# Patient Record
Sex: MALE | Race: WHITE | NOT HISPANIC OR LATINO | Employment: OTHER | URBAN - METROPOLITAN AREA
[De-identification: names, ages, dates, MRNs, and addresses within clinical notes are randomized per-mention and may not be internally consistent; named-entity substitution may affect disease eponyms.]

---

## 2017-01-18 ENCOUNTER — HOME CARE/HOSPICE - HEALTHEAST (OUTPATIENT)
Dept: HOME HEALTH SERVICES | Facility: HOME HEALTH | Age: 62
End: 2017-01-18

## 2017-03-27 ENCOUNTER — OFFICE VISIT - HEALTHEAST (OUTPATIENT)
Dept: VASCULAR SURGERY | Facility: CLINIC | Age: 62
End: 2017-03-27

## 2017-03-27 DIAGNOSIS — I89.0 ELEPHANTIASIS: ICD-10-CM

## 2017-03-27 DIAGNOSIS — E66.01 MORBID OBESITY WITH BMI OF 50.0-59.9, ADULT (H): ICD-10-CM

## 2017-03-27 DIAGNOSIS — I89.0 ACQUIRED LYMPHEDEMA: ICD-10-CM

## 2017-03-27 DIAGNOSIS — I87.303 VENOUS HYPERTENSION OF LOWER EXTREMITY, BILATERAL: ICD-10-CM

## 2017-03-27 RX ORDER — DULOXETIN HYDROCHLORIDE 60 MG/1
60 CAPSULE, DELAYED RELEASE ORAL
Status: SHIPPED | COMMUNITY
Start: 2017-03-07

## 2017-09-28 ENCOUNTER — OFFICE VISIT - HEALTHEAST (OUTPATIENT)
Dept: VASCULAR SURGERY | Facility: CLINIC | Age: 62
End: 2017-09-28

## 2017-09-28 DIAGNOSIS — L90.5 SCAR CONDITION AND FIBROSIS OF SKIN: ICD-10-CM

## 2017-09-28 DIAGNOSIS — E66.01 MORBID OBESITY WITH BMI OF 50.0-59.9, ADULT (H): ICD-10-CM

## 2017-09-28 DIAGNOSIS — I87.303 VENOUS HYPERTENSION OF LOWER EXTREMITY, BILATERAL: ICD-10-CM

## 2017-09-28 DIAGNOSIS — I87.2 VENOUS INSUFFICIENCY OF BOTH LOWER EXTREMITIES: ICD-10-CM

## 2017-09-28 DIAGNOSIS — M79.89 SWELLING OF LIMB: ICD-10-CM

## 2017-09-28 DIAGNOSIS — I89.0 ACQUIRED LYMPHEDEMA: ICD-10-CM

## 2017-09-28 DIAGNOSIS — L85.9 HYPERKERATOSIS OF SKIN: ICD-10-CM

## 2017-09-28 DIAGNOSIS — E11.42 DIABETIC PERIPHERAL NEUROPATHY ASSOCIATED WITH TYPE 2 DIABETES MELLITUS (H): ICD-10-CM

## 2017-09-28 RX ORDER — UREA 8.5 G/85G
CREAM TOPICAL
Status: SHIPPED | COMMUNITY
Start: 2017-08-29

## 2017-09-28 RX ORDER — IBUPROFEN 600 MG/1
TABLET ORAL
Refills: 4 | Status: SHIPPED | COMMUNITY
Start: 2017-08-07

## 2017-09-28 RX ORDER — OXYCODONE HYDROCHLORIDE 5 MG/1
5 TABLET ORAL
Status: SHIPPED | COMMUNITY
Start: 2017-09-05 | End: 2021-07-12

## 2017-09-28 RX ORDER — ACETIC ACID 20.65 MG/ML
SOLUTION AURICULAR (OTIC)
Status: SHIPPED | COMMUNITY
Start: 2017-08-29

## 2018-03-29 ENCOUNTER — OFFICE VISIT - HEALTHEAST (OUTPATIENT)
Dept: VASCULAR SURGERY | Facility: CLINIC | Age: 63
End: 2018-03-29

## 2018-03-29 DIAGNOSIS — I89.0 ACQUIRED LYMPHEDEMA: ICD-10-CM

## 2018-03-29 DIAGNOSIS — E66.9 EXTREME OBESITY: ICD-10-CM

## 2018-03-29 DIAGNOSIS — I87.2 VENOUS INSUFFICIENCY OF BOTH LOWER EXTREMITIES: ICD-10-CM

## 2018-03-29 DIAGNOSIS — I89.0 ELEPHANTIASIS: ICD-10-CM

## 2018-03-29 DIAGNOSIS — M79.89 SWELLING OF LIMB: ICD-10-CM

## 2018-03-29 DIAGNOSIS — E66.01 MORBID OBESITY WITH BMI OF 50.0-59.9, ADULT (H): ICD-10-CM

## 2018-03-29 DIAGNOSIS — L90.5 SCAR CONDITION AND FIBROSIS OF SKIN: ICD-10-CM

## 2018-03-29 DIAGNOSIS — I87.303 VENOUS HYPERTENSION OF LOWER EXTREMITY, BILATERAL: ICD-10-CM

## 2018-03-29 RX ORDER — WARFARIN SODIUM 3 MG/1
4 TABLET ORAL DAILY
Status: SHIPPED | COMMUNITY
Start: 2018-03-06

## 2018-11-29 ENCOUNTER — OFFICE VISIT - HEALTHEAST (OUTPATIENT)
Dept: VASCULAR SURGERY | Facility: CLINIC | Age: 63
End: 2018-11-29

## 2018-11-29 DIAGNOSIS — E66.9 EXTREME OBESITY: ICD-10-CM

## 2018-11-29 DIAGNOSIS — E11.42 DIABETIC PERIPHERAL NEUROPATHY ASSOCIATED WITH TYPE 2 DIABETES MELLITUS (H): ICD-10-CM

## 2018-11-29 DIAGNOSIS — I89.0 ELEPHANTIASIS: ICD-10-CM

## 2018-11-29 DIAGNOSIS — L85.9 HYPERKERATOSIS OF SKIN: ICD-10-CM

## 2018-11-29 DIAGNOSIS — I89.0 ACQUIRED LYMPHEDEMA: ICD-10-CM

## 2018-11-29 ASSESSMENT — MIFFLIN-ST. JEOR: SCORE: 2399.26

## 2019-06-03 ENCOUNTER — OFFICE VISIT - HEALTHEAST (OUTPATIENT)
Dept: VASCULAR SURGERY | Facility: CLINIC | Age: 64
End: 2019-06-03

## 2019-06-03 DIAGNOSIS — I89.0 ACQUIRED LYMPHEDEMA: ICD-10-CM

## 2019-06-03 DIAGNOSIS — E66.01 MORBID OBESITY WITH BMI OF 50.0-59.9, ADULT (H): ICD-10-CM

## 2019-06-03 DIAGNOSIS — L90.5 SCAR CONDITION AND FIBROSIS OF SKIN: ICD-10-CM

## 2019-06-03 DIAGNOSIS — I89.0 ELEPHANTIASIS: ICD-10-CM

## 2019-06-03 DIAGNOSIS — I87.303 VENOUS HYPERTENSION OF LOWER EXTREMITY, BILATERAL: ICD-10-CM

## 2019-06-03 DIAGNOSIS — I87.2 VENOUS INSUFFICIENCY OF BOTH LOWER EXTREMITIES: ICD-10-CM

## 2019-06-03 DIAGNOSIS — L85.9 HYPERKERATOSIS OF SKIN: ICD-10-CM

## 2019-06-03 DIAGNOSIS — M79.89 SWELLING OF LIMB: ICD-10-CM

## 2019-06-03 DIAGNOSIS — E11.42 DIABETIC PERIPHERAL NEUROPATHY ASSOCIATED WITH TYPE 2 DIABETES MELLITUS (H): ICD-10-CM

## 2019-06-03 RX ORDER — GLUCOSAMINE HCL/CHONDROITIN SU 500-400 MG
CAPSULE ORAL
Status: SHIPPED | COMMUNITY
Start: 2019-04-03

## 2019-06-03 RX ORDER — PEN NEEDLE, DIABETIC 32GX 5/32"
NEEDLE, DISPOSABLE MISCELLANEOUS
Refills: 5 | Status: SHIPPED | COMMUNITY
Start: 2019-04-29

## 2019-06-03 RX ORDER — LISINOPRIL 20 MG/1
20 TABLET ORAL DAILY
Refills: 1 | Status: SHIPPED | COMMUNITY
Start: 2019-03-09

## 2019-06-03 RX ORDER — GLIMEPIRIDE 4 MG/1
TABLET ORAL
Status: SHIPPED | COMMUNITY
Start: 2019-03-17

## 2019-06-03 RX ORDER — CYCLOBENZAPRINE HCL 5 MG
5-10 TABLET ORAL
Status: SHIPPED | COMMUNITY
Start: 2019-04-10

## 2019-06-03 RX ORDER — ZINC GLUCONATE 50 MG
1 TABLET ORAL
Status: SHIPPED | COMMUNITY
Start: 2019-03-14

## 2019-06-03 RX ORDER — SYRINGE,INSUL U-500,NDL,0.5ML 31GX15/64"
SYRINGE, EMPTY DISPOSABLE MISCELLANEOUS
Refills: 1 | Status: SHIPPED | COMMUNITY
Start: 2018-06-05

## 2019-06-03 RX ORDER — MAGNESIUM GLUCONATE 30 MG(550)
1 TABLET ORAL
Status: SHIPPED | COMMUNITY
Start: 2019-03-14

## 2019-06-03 RX ORDER — LEVETIRACETAM 750 MG/1
750 TABLET ORAL 2 TIMES DAILY
Refills: 2 | Status: SHIPPED | COMMUNITY
Start: 2018-08-05

## 2019-06-03 RX ORDER — EMPAGLIFLOZIN, METFORMIN HYDROCHLORIDE 12.5; 1 MG/1; MG/1
TABLET, EXTENDED RELEASE ORAL
Refills: 11 | Status: SHIPPED | COMMUNITY
Start: 2019-05-10

## 2019-06-03 RX ORDER — SYRINGE-NEEDLE,INSULIN,0.5 ML 27GX1/2"
SYRINGE, EMPTY DISPOSABLE MISCELLANEOUS
Refills: 0 | Status: SHIPPED | COMMUNITY
Start: 2018-12-02

## 2019-06-03 ASSESSMENT — MIFFLIN-ST. JEOR: SCORE: 2433.27

## 2019-06-05 ENCOUNTER — COMMUNICATION - HEALTHEAST (OUTPATIENT)
Dept: VASCULAR SURGERY | Facility: CLINIC | Age: 64
End: 2019-06-05

## 2019-12-05 ENCOUNTER — OFFICE VISIT - HEALTHEAST (OUTPATIENT)
Dept: VASCULAR SURGERY | Facility: CLINIC | Age: 64
End: 2019-12-05

## 2019-12-05 DIAGNOSIS — E66.01 MORBID OBESITY WITH BMI OF 50.0-59.9, ADULT (H): ICD-10-CM

## 2019-12-05 DIAGNOSIS — I89.0 ELEPHANTIASIS: ICD-10-CM

## 2019-12-05 DIAGNOSIS — I87.303 VENOUS HYPERTENSION OF LOWER EXTREMITY, BILATERAL: ICD-10-CM

## 2019-12-05 DIAGNOSIS — I89.0 ACQUIRED LYMPHEDEMA: ICD-10-CM

## 2019-12-05 DIAGNOSIS — I87.2 VENOUS INSUFFICIENCY OF BOTH LOWER EXTREMITIES: ICD-10-CM

## 2019-12-05 DIAGNOSIS — L85.9 HYPERKERATOSIS OF SKIN: ICD-10-CM

## 2019-12-05 DIAGNOSIS — L90.5 SCAR CONDITION AND FIBROSIS OF SKIN: ICD-10-CM

## 2019-12-05 DIAGNOSIS — E11.42 DIABETIC PERIPHERAL NEUROPATHY ASSOCIATED WITH TYPE 2 DIABETES MELLITUS (H): ICD-10-CM

## 2019-12-05 RX ORDER — LOPERAMIDE HCL 2 MG
2 CAPSULE ORAL EVERY 4 HOURS PRN
Status: SHIPPED | COMMUNITY
Start: 2019-12-05

## 2019-12-05 RX ORDER — POLYMYXIN B SULFATE AND TRIMETHOPRIM 1; 10000 MG/ML; [USP'U]/ML
SOLUTION OPHTHALMIC
Status: SHIPPED | COMMUNITY
Start: 2019-12-05

## 2019-12-05 RX ORDER — TOBRAMYCIN AND DEXAMETHASONE 3; 1 MG/ML; MG/ML
1 SUSPENSION/ DROPS OPHTHALMIC
Status: SHIPPED | COMMUNITY
Start: 2019-12-05

## 2019-12-05 RX ORDER — IRON ASPGLY/C/B12/FA/CA-TH/SUC 70-150-1MG
1 TABLET ORAL DAILY
Status: SHIPPED | COMMUNITY
Start: 2019-12-05

## 2019-12-05 RX ORDER — EMPAGLIFLOZIN 25 MG/1
1 TABLET, FILM COATED ORAL DAILY
Refills: 0 | Status: SHIPPED | COMMUNITY
Start: 2019-09-20

## 2019-12-05 ASSESSMENT — MIFFLIN-ST. JEOR: SCORE: 2347.09

## 2021-02-22 ENCOUNTER — OFFICE VISIT - HEALTHEAST (OUTPATIENT)
Dept: VASCULAR SURGERY | Facility: CLINIC | Age: 66
End: 2021-02-22

## 2021-02-22 DIAGNOSIS — M79.89 SWELLING OF LIMB: ICD-10-CM

## 2021-02-22 DIAGNOSIS — L85.9 HYPERKERATOSIS OF SKIN: ICD-10-CM

## 2021-02-22 DIAGNOSIS — E11.42 DIABETIC PERIPHERAL NEUROPATHY ASSOCIATED WITH TYPE 2 DIABETES MELLITUS (H): ICD-10-CM

## 2021-02-22 DIAGNOSIS — L90.5 SCAR CONDITION AND FIBROSIS OF SKIN: ICD-10-CM

## 2021-02-22 DIAGNOSIS — I89.0 ACQUIRED LYMPHEDEMA: ICD-10-CM

## 2021-02-22 DIAGNOSIS — I87.303 VENOUS HYPERTENSION OF LOWER EXTREMITY, BILATERAL: ICD-10-CM

## 2021-02-22 DIAGNOSIS — E66.01 MORBID OBESITY WITH BMI OF 50.0-59.9, ADULT (H): ICD-10-CM

## 2021-02-22 DIAGNOSIS — I89.0 ELEPHANTIASIS: ICD-10-CM

## 2021-02-22 DIAGNOSIS — I69.052: ICD-10-CM

## 2021-05-29 NOTE — PROGRESS NOTES
Date of Service: 06/03/19    Date last seen: 11/29/18    PCP: Irving Hodgson MD    Impression:   1.   Bilateral leg swelling -doing much better  2.   Secondary lymphedema-Stage 3 severe -elephantiasis  3.   Venous hypertension bilateral legs  4.   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  5.   Hypertrophy of toenails  6.   Long term anticoagulation  7.   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   Complicated by morbid obesity   9.   Complicated by type 2 diabetes with peripheral neuropathy    Plan:   1.   Questions were answered.  He is here with his cousin.  2.   Continue velcro compression.  3.   Continue lac hydrin for hyperkeratosis. After discussion of risk factors and consent obtained under clean conditions, with his underlying diabetes and anticoagulation the hyperkeratosis was debrided as it has softened.  The curette was used.  A total of 82 cm  was debrided .  This took off significant pressure from the skin, decreasing his risk of further damage.  This type of debridement is considered through the epidermis area.  The feet and ankles were much improved after this..  4.   Elevate legs as much as able.  He is doing very well and he should continue his present cares.  5.   Patient will follow up in 6 months or when needed.     Time spent with patient 15 minutes with greater than 50% time in consultation, education and coordination of care.   ---------------------------------------------------------------------------------------------------------------------     Chief Complaint: Bilateral leg swelling and toe ulcerations    History of Present Illness:   Karsten Mccain returns to the [facility] for follow up of bilateral leg swelling with toe ulcerations. He is here with his sister. This is  complicated by severe secondary lymphedema stage III, elephantiasis, with fibrosis scarring with keratoderma and papillomatosis. He has right leg paresis status post old left CVA. He has type 2 diabetes with peripheral neuropathy and is supra-morbidly obese and his history is further complicated by right hemorrhagic CVA causing slight left hand weakness and word finding difficulties.  He is regularly wearing his velcro compression which works well for him. There is no weeping. He says the lac hydrin cream and this continues to decrease the hyperkeratosis. Swelling is under good control.  There has been no new numbness, tingling, weakness, masses, rashes, shortness of breath or chest pain. There has been no new fevers or pain.  Pain is rated a 0 on a 10 point scale.        Past Medical History:   Diagnosis Date     Abscess or cellulitis of leg 2/27/2015     Acquired lymphedema 1/11/2011     Activated protein C resistance (H) 1/16/2011     Advance directive discussed with patient 12/20/2010    Overview:  Patient has identified Health Care Agent(s): Yes Add Health Care Agents: Yes   Health Care Agent(s): Primary Health Care Agent: Miguel Andrewnatalie Relationship: Father Phone: [phone]  Secondary Health Care Agent: Ana Lilia Mccain Relationship: Mother Phone: [phone]     Patient has Advanced Care Plan Documents (Health Care Directive, POLST): Yes   Advance Care Plan Documents: Health     Airway hyperreactivity 2/27/2013     Amnesia 9/2/2010     Ankylosing polyarthritis (H) 6/1/2006    Overview:  On methotrexate and remicade  Overview:  diagnosed 1982 sees Dr. Reis      Apnea, sleep 6/13/2006    Overview:  Uses nasal BIPAP inactive icd-9 diagnosis auto replaced with icd-10, display name retained//mporz      Arthralgia of hip 3/5/2013     Avitaminosis D 3/8/2012    Overview:  inactive icd-9 diagnosis auto replaced with icd-10, display name retained//mporz      Benign prostatic hyperplasia with urinary obstruction  2/4/2008     Cannot sleep 11/6/2008    Overview:  inactive icd-9 diagnosis auto replaced with icd-10, display name retained//mporz      Cardiac failure right (H) 3/30/2011     Cerebral infarct (H) 1/11/2011    Overview:  Basal ganglion       Clotting disorder (H)      Combined fat and carbohydrate induced hyperlipemia 11/25/2010     Diabetes (H) 1/11/2011    Overview:  HEMOGLOBIN A1C (%)  Date Value  11/27/2010 9.1*  7/1/2004 6.2        Diabetic neuropathy (H) 3/1/2011     Disturbance of skin sensation 11/2/2015     DU (duodenal ulcer) 3/29/2015     Encounter for therapeutic drug monitoring 11/5/2013     Essential (primary) hypertension 11/2/2015    Overview:  inactive icd-9 diagnosis auto replaced with icd-10, display name retained//mporz      Extreme obesity 11/2/2015     Foot ulcer (H) 3/5/2013     Hypertensive pulmonary vascular disease (H) 1/11/2012    fused rib cage causing restriction and pulm htn related RV failure.     Stroke (H)     right leg and arm weakness around 2010/2011 he thinks., has poor memory and his initial aphasia as well that has improved.       Past Surgical History:   Procedure Laterality Date     CHOLECYSTECTOMY       colostomy         Current Outpatient Medications   Medication Sig Dispense Refill     acetaminophen (TYLENOL) 500 MG tablet Take 1,000 mg by mouth every 8 (eight) hours.        acetic acid (VOSOL) 2 % otic solution Administer into ears.       ammonium lactate (LAC-HYDRIN) 12 % lotion        atorvastatin (LIPITOR) 40 MG tablet Take 40 mg by mouth daily. Indications: Hypercholesterolemia       baclofen (LIORESAL) 10 MG tablet TK 1 T PO TID  5     blood glucose test (CONTOUR NEXT TEST STRIPS) strips by Other route.       bumetanide (BUMEX) 1 MG tablet Take 2 mg by mouth daily.       cholecalciferol, vitamin D3, 5,000 unit capsule Take 1 capsule by mouth.       clindamycin (CLINDAGEL) 1 % gel Apply topically.       clotrimazole (LOTRIMIN) 1 % cream Apply topically.        "cyclobenzaprine (FLEXERIL) 5 MG tablet Take 5-10 mg by mouth.       diphenhydrAMINE (BENADRYL) 12.5 mg/5 mL liquid Take 12.5 mg by mouth.       DULoxetine (CYMBALTA) 60 MG capsule Take 60 mg by mouth.       finasteride (PROSCAR) 5 mg tablet Take 5 mg by mouth daily.       folic acid (FOLVITE) 1 MG tablet Take 1 mg by mouth daily.       gabapentin (NEURONTIN) 400 MG capsule Take 800 mg by mouth 3 (three) times a day. Indications: Neuropathic Pain       generic lancets (ACCU-CHEK MULTICLIX LANCET) Test five times daily as directed       glimepiride (AMARYL) 4 MG tablet TAKE 1 TABLET(4 MG) BY MOUTH TWICE DAILY       guaiFENesin (HUMIBID 3) 400 mg Tab Take 400 mg by mouth every 4 (four) hours as needed.       hydrocortisone 1 % cream Apply 1 application topically as needed.       inFLIXimab (REMICADE) 100 mg injection Infuse 600 mg into a venous catheter every 2 (two) months.       insulin regular hum U-500 conc, HumuLIN R, (HUMULIN R CONC) 500 unit/mL CONCENTRATED injection USING A U100 SYRINGE DRAW UP 50 UNITS FOR SUBCUTANEOUS INJECTION BEFORE BREAKFAST, 60 UNITS BEFORE DINNER AND 20 UNITS AT MIDNIGHT  Indications: Diabetes Mellitus with Severe Insulin Resistance       insulin syringe-needle U-100 1 mL 31 gauge x 5/16 Syrg TEST TID.  0     insulin syringe-needle U-100 1/2 mL 31 x 5/16\" Syrg        insulin U-500 syringe-needle (BD INSULIN SYRINGE U-500) 1/2 mL 31 gauge x 15/64\" Syrg Use to give insulin injections 2 times daily.       lactic acid-urea Lotn Apply to crusting, thickened areas of skin twice a day until clear. 60 mL 3     levETIRAcetam (KEPPRA) 750 MG tablet Take 750 mg by mouth 2 (two) times a day.  2     lidocaine (LIDODERM) 5 % Place 1 patch on the skin daily.       lisinopril (PRINIVIL,ZESTRIL) 20 MG tablet Take 20 mg by mouth daily.  1     loratadine (CLARITIN) 10 mg tablet Take 10 mg by mouth daily.       magnesium gluconate 30 mg (550 mg) Tab Take 1 tablet by mouth.       magnesium oxide 250 mg Tab " "Take 500 mg by mouth 3 (three) times a week.        metFORMIN (GLUCOPHAGE) 500 MG tablet TAKE 2 TABLETS BY MOUTH TWICE DAILY WITH BREAKFAST AND DINNER       methocarbamol (ROBAXIN) 500 MG tablet 1 tab three times a day as needed       methotrexate 2.5 MG tablet Take 10 mg by mouth once a week.       metoprolol succinate (TOPROL-XL) 25 MG Take 25 mg by mouth 2 (two) times a day.       miscellaneous medical supply Misc Bipap, heated humidifer, mask, headgear, filters and tubing. Pressure 19/13.  For home use. Length of need: 99       nitroglycerin (NITROSTAT) 0.4 MG SL tablet Place 1 tablet under the tongue as needed.       nystatin (MYCOSTATIN) powder Apply topically as needed.       omeprazole (PRILOSEC) 20 MG capsule Take 20 mg by mouth.       oxyCODONE (ROXICODONE) 5 MG immediate release tablet Take 5 mg by mouth.       pantoprazole (PROTONIX) 40 MG tablet Take 40 mg by mouth 2 (two) times a day. Indications: Gastric Ulcer       pen needle, diabetic (BD ULTRA-FINE PAPO PEN NEEDLE) 32 gauge x 5/32\" Ndle Use as directed with Insulin Pen and Victoza       potassium chloride (KLOR-CON) 10 MEQ CR tablet Take 40 mEq by mouth daily. 3 tablets at lunch, 3 tablets at dinner.       prochlorperazine (COMPAZINE) 10 MG tablet Take 10 mg by mouth every 8 (eight) hours as needed.       spironolactone (ALDACTONE) 50 MG tablet Take 100 mg by mouth daily.       SYNJARDY XR 12.5-1,000 mg TBph TK 1 T PO BID. REPLACING INVOKAMET  11     tamsulosin (FLOMAX) 0.4 mg Cp24 Take 0.4 mg by mouth daily.       terbinafine HCl (LAMISIL AT) 1 % cream Apply 1 application topically. To legs PRN       urea-alpha hydroxy acids (ATRAC-TAIN) 10-4 % Crea Apply topically.       warfarin (COUMADIN) 3 MG tablet Take 4 mg by mouth daily.       zinc gluconate 50 mg tablet Take 1 tablet by mouth.       amoxicillin (AMOXIL) 500 MG capsule Take 500 mg by mouth as needed. Prior to dental work       BD INSULIN SYRINGE U-500 1/2 mL 31 gauge x 15/64\" Syrg USE BID  " "1     BD ULTRA-FINE PAPO PEN NEEDLE 32 gauge x 5/32\" Ndle USE UTD WITH VICTOZA AND INSULIN PENS  5     canagliflozin (INVOKANA) 300 mg Tab Take 300 mg by mouth daily. Indications: Type 2 Diabetes Mellitus       canagliflozin-metformin 150-1,000 mg Tab Take 1 tablet by mouth.       cholecalciferol, vitamin D3, 5,000 unit capsule Take 5,000 Units by mouth daily.  3     CONTOUR NEXT TEST STRIPS strips TEST FID  3     ferrous gluconate 324 mg (37.5 mg iron) Tab Take 1 tablet by mouth daily.       liraglutide (VICTOZA 2-KAYLYNN) 0.6 mg/0.1 mL (18 mg/3 mL) PnIj injection Inject 1.8 mg under the skin daily.       NASONEX 50 mcg/actuation nasal spray SHAKE LQ AND U 2 SPRAYS IEN BID  4     Current Facility-Administered Medications   Medication Dose Route Frequency Provider Last Rate Last Dose     lidocaine 2 % jelly (XYLOCAINE)   Topical PRN Laura Levy MD   1 application at 10/24/16 1235       Allergies   Allergen Reactions     Aspirin Other (See Comments)     Not allergic. Patient is on coumadin  Patient on coumadin       Clindamycin Other (See Comments)     Significant heartburn when taking 300 mg four times per day .     Levaquin [Levofloxacin] Other (See Comments)     Muscle burn per pt.     Lovastatin Myalgia     LW Reaction: myalgias     Other Allergy (See Comments) Other (See Comments)     PN: LW FI1: nka  PN: LW CM1: Contrast Adverse Reaction - None Reaction :  PN: LW CM1: Contrast Adverse Reaction - None Reaction :       Social History     Socioeconomic History     Marital status: Single     Spouse name: Not on file     Number of children: Not on file     Years of education: Not on file     Highest education level: Not on file   Occupational History     Not on file   Social Needs     Financial resource strain: Not on file     Food insecurity:     Worry: Not on file     Inability: Not on file     Transportation needs:     Medical: Not on file     Non-medical: Not on file   Tobacco Use     Smoking status: " Never Smoker     Smokeless tobacco: Never Used   Substance and Sexual Activity     Alcohol use: No     Comment: rare socially, not in years.     Drug use: No     Sexual activity: Never   Lifestyle     Physical activity:     Days per week: Not on file     Minutes per session: Not on file     Stress: Not on file   Relationships     Social connections:     Talks on phone: Not on file     Gets together: Not on file     Attends [employer] service: Not on file     Active member of club or organization: Not on file     Attends meetings of clubs or organizations: Not on file     Relationship status: Not on file     Intimate partner violence:     Fear of current or ex partner: Not on file     Emotionally abused: Not on file     Physically abused: Not on file     Forced sexual activity: Not on file   Other Topics Concern     Not on file   Social History Narrative    Single.  No kids.  Retired .       Family History   Problem Relation Age of Onset     Arthritis Mother      Heart disease Father      Diabetes Sister      Cancer Sister      Uterine cancer Sister      Diabetes Sister      No Medical Problems Sister      Stroke Brother      Review of Systems:    Karsten Mccain no new numbess, tingling or weakness, redness or rashes, fevers, new masses, abdominal bloating or discomfort, unexplained weight loss, increased pain, new ulcers, shortness of breath and chest pain  Full 12 point review of systems was completed.    Labs:    I personally reviewed the following labs today and those on care everywhere, if indicated    4/3/2019 creatinine 1 GFR greater than 60 hemoglobin A1c 10.1    No results found for: SEDRATE      No results found for: CRP        Lab Results   Component Value Date    CREATININE 1.12 01/17/2017      No results found for: HGBA1C        Lab Results   Component Value Date    BUN 22 01/17/2017              No results found for: LABPROT, ALBUMIN    No results found for: [id number]    No  results found for: TSH  No results found for: WBC, HGB, HCT, MCV, PLT     Imaging:      I personally reviewed the following imaging today and those on care everywhere, if indicated      Interface, Michelle - 08/03/2018  7:40 AM CDT  Clinical History: F/U Intracranial Bleed    Technique: Noncontrast axial CT images are obtained of the brain. This CT Scan used dose modulation, iterative reconstruction, and/or weight based dosing when appropriate to reduce radiation dose to as low as reasonably achievable.    Comparison: 8/2/2018 head CT    Findings: Right frontal intraparenchymal hematoma measures 3.0 x 3.6 cm (series 2 image 22) compared to 2.7 x 3.6 cm on the study of one day prior. There is surrounding vasogenic edema. There is small amount of extra-axial hemorrhage overlying the right frontal lobe and extending into several subjacent sulci. There is local sulcal effacement. No significant mass effect. No midline shift. Ventricles normal size. Basal cisterns are widely patent.    Encephalomalacia in the left corona radiata and basal ganglia region with subjacent ex vacuo frontal horn dilatation.    No calvarial fracture. Paranasal sinuses and temporal bone structures are well aerated. There is atlantodental joint osteoarthrosis. Skull base structures are intact.    Impression:   1. Grossly stable right frontal intraparenchymal hemorrhage with surrounding vasogenic edema and small amount of extra-axial hemorrhage overlying the right frontal lobe and extending to several adjacent sulci. No significant mass effect.  2. Left cerebral hemispheric encephalomalacia.   3. The right frontal hematoma is an atypical location for hypertensive bleed. Hemorrhage related to underlying mass lesion, vascular malformation or cerebral amyloid angiopathy are considerations. Consider further evaluation with brain MRI/MRA.    [facility]  Nothing new to review  Physical Exam:  Vitals:    06/03/19 1409   BP:  122/72   Pulse: 64   Resp: 20   Temp: 98.6  F (37  C)     BMI 60.69 (increased) weight 376 pounds    Circumferential measures:    Vasc Edema 3/27/2017 9/28/2017 3/29/2018 11/29/2018 6/3/2019   Right just above MTP 35 24.7 34 34.5 33.3   Right Ankle 35.5 38.2 35 35.3 35   Right Widest Calf 55 53.3 51.5  53.8 50.7   Right Thigh Up 10cm 71.3 74.5   72.5 70.9   Left - just above MTP 35.7 35.5 35 35 34   Left Ankle 35.6 38.2 34.5 35.8 34.8   Left Widest Calf 53.2 52.3 52.5 54.4 47.8   Left Thigh Up 10cm 71.8 72.9 - 76.2 70.8     Measures are stable.    General:  63 y.o. male in no apparent distress.      Psych:  Alert and oriented x 3.  Cooperative. Affect normal.    Vascular: There are no significant telangietasias, medial ankle venous flares, venous varicosities  and spider veins .     Neurological:  Decreased to PP and LT in the feet bilaterally.  Strength normal in knee flexion/extentsion, ankle dorsiflexion and great toe extension bilaterally.      Integumentary: Skin of the legs is uniformly warm and dry, erythematous, with papillomatosis, with hyperkeratosis and keratoderma and with positive Stemmer's Sign in the bilateral toes.  The hyperkeratosis continues to decrease and is much more easily removed.  There is just a small amount remaining.  There is no weeping in either leg or foot.  Toenails are hypertrophied.  Skin of the legs and feet continue to be improved from his baseline and is softer.  No open ulcerations are noted. There is no pain to palpation.    Laura Levy MD, ABWMS, FACCWS, [facility]  Medical Director Wound Care and Lymphedema  [facility]  [phone]

## 2021-05-29 NOTE — TELEPHONE ENCOUNTER
Prism spoke with patient's sister, they decided not to purchase the velcro compression at that time but may call back in the future to purchase.

## 2021-06-02 VITALS — HEIGHT: 67 IN | WEIGHT: 315 LBS | BODY MASS INDEX: 49.44 KG/M2

## 2021-06-03 VITALS — WEIGHT: 315 LBS | BODY MASS INDEX: 50.62 KG/M2 | HEIGHT: 66 IN

## 2021-06-04 VITALS
DIASTOLIC BLOOD PRESSURE: 54 MMHG | WEIGHT: 315 LBS | HEART RATE: 68 BPM | RESPIRATION RATE: 24 BRPM | HEIGHT: 66 IN | SYSTOLIC BLOOD PRESSURE: 102 MMHG | BODY MASS INDEX: 50.62 KG/M2 | TEMPERATURE: 98.2 F

## 2021-06-04 NOTE — PROGRESS NOTES
Date of Service: 12/05/19    Date last seen: 06/03/19    PCP: Irving Hodgson MD    Impression:   1. Bilateral leg swelling -doing much better  2. Secondary lymphedema-Stage 3 severe -elephantiasis  3. Venous hypertension bilateral legs  4. 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  5. Long term anticoagulation  6. 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 Morbid obesity   8. Type 2 diabetes with peripheral neuropathy    Plan:   1. Questions were answered.  He is here with his cousin.  2. Continue velcro compression.  My nurse will measure him and give him information on how to get new Velcro compression.  3. Continue lac hydrin for hyperkeratosis.   4. After discussion of risk factors and consent obtained under clean conditions, with his underlying diabetes and anticoagulation the hyperkeratosis was debrided as it has softened.  The curette was used.  A total of 45 cm  was debrided .  This took off significant pressure from the skin, decreasing his risk of further damage.  This type of debridement is considered through the epidermis area.  The feet and ankles were much improved after this.  5. Patient will follow up in 6 months or when needed.     Time spent with patient 15 minutes with greater than 50% time in consultation, education and coordination of care.   ---------------------------------------------------------------------------------------------------------------------     Chief Complaint: Bilateral leg swelling and toe ulcerations    History of Present Illness:   Karsten Mccain returns to the  [facility] for follow up of bilateral leg swelling with toe ulcerations complicated by severe secondary lymphedema stage III, elephantiasis, with fibrosis scarring with keratoderma, papillomatosis and right leg  paresis status post old left CVA. He has type 2 diabetes with peripheral neuropathy and is morbidly obese.  He is here with his cousin.  They report the swelling is under good control.  He is regularly wearing his velcro compression which works well for him. There is no weeping. He continues to use the lac hydrin cream to decrease the hyperkeratosis.  He would like an additional pair of Velcro compression as the other ones are starting to wear.  He has not had any new ulcerations or signs of infections.  There has been no new numbness, tingling, weakness, masses, rashes, shortness of breath or chest pain. There has been no new fevers or pain.  Pain is rated a 0 on a 10 point scale.        Past Medical History:   Diagnosis Date     Abscess or cellulitis of leg 2/27/2015     Acquired lymphedema 1/11/2011     Activated protein C resistance (H) 1/16/2011     Advance directive discussed with patient 12/20/2010    Overview:  Patient has identified Health Care Agent(s): Yes Add Health Care Agents: Yes   Health Care Agent(s): Primary Health Care Agent: Miguel Mccain Relationship: Father Phone: [phone]  Secondary Health Care Agent: Ana Lilia Mccain Relationship: Mother Phone: [phone]     Patient has Advanced Care Plan Documents (Health Care Directive, POLST): Yes   Advance Care Plan Documents: Health     Airway hyperreactivity 2/27/2013     Amnesia 9/2/2010     Ankylosing polyarthritis (H) 6/1/2006    Overview:  On methotrexate and remicade  Overview:  diagnosed 1982 sees Dr. Reis      Apnea, sleep 6/13/2006    Overview:  Uses nasal BIPAP inactive icd-9 diagnosis auto replaced with icd-10, display name retained//mporz      Arthralgia of hip 3/5/2013     Avitaminosis D 3/8/2012    Overview:  inactive icd-9 diagnosis auto replaced with icd-10, display name retained//mporz      Benign prostatic hyperplasia with urinary obstruction 2/4/2008     Cannot sleep 11/6/2008    Overview:  inactive icd-9 diagnosis auto  "replaced with icd-10, display name retained//mporz      Cardiac failure right (H) 3/30/2011     Cerebral infarct (H) 1/11/2011    Overview:  Basal ganglion       Clotting disorder (H)      Combined fat and carbohydrate induced hyperlipemia 11/25/2010     Diabetes (H) 1/11/2011    Overview:  HEMOGLOBIN A1C (%)  Date Value  11/27/2010 9.1*  7/1/2004 6.2        Diabetic neuropathy (H) 3/1/2011     Disturbance of skin sensation 11/2/2015     DU (duodenal ulcer) 3/29/2015     Encounter for therapeutic drug monitoring 11/5/2013     Essential (primary) hypertension 11/2/2015    Overview:  inactive icd-9 diagnosis auto replaced with icd-10, display name retained//mporz      Extreme obesity 11/2/2015     Foot ulcer (H) 3/5/2013     Hypertensive pulmonary vascular disease (H) 1/11/2012    fused rib cage causing restriction and pulm htn related RV failure.     Stroke (H)     right leg and arm weakness around 2010/2011 he thinks., has poor memory and his initial aphasia as well that has improved.       Past Surgical History:   Procedure Laterality Date     CHOLECYSTECTOMY       colostomy         Current Outpatient Medications   Medication Sig Dispense Refill     acetaminophen (TYLENOL) 500 MG tablet Take 1,000 mg by mouth every 8 (eight) hours.        acetic acid (VOSOL) 2 % otic solution Administer into ears.       ammonium lactate (LAC-HYDRIN) 12 % lotion        amoxicillin (AMOXIL) 500 MG capsule Take 500 mg by mouth as needed. Prior to dental work       atorvastatin (LIPITOR) 40 MG tablet Take 40 mg by mouth daily. Indications: Hypercholesterolemia       baclofen (LIORESAL) 10 MG tablet TK 1 T PO TID  5     BD INSULIN SYRINGE U-500 1/2 mL 31 gauge x 15/64\" Syrg USE BID  1     BD ULTRA-FINE PAPO PEN NEEDLE 32 gauge x 5/32\" Ndle USE UTD WITH VICTOZA AND INSULIN PENS  5     blood glucose test (CONTOUR NEXT TEST STRIPS) strips by Other route.       bumetanide (BUMEX) 1 MG tablet Take 2 mg by mouth daily.       canagliflozin " "(INVOKANA) 300 mg Tab Take 300 mg by mouth daily. Indications: Type 2 Diabetes Mellitus       canagliflozin-metformin 150-1,000 mg Tab Take 1 tablet by mouth.       cholecalciferol, vitamin D3, 5,000 unit capsule Take 1 capsule by mouth.       cholecalciferol, vitamin D3, 5,000 unit capsule Take 5,000 Units by mouth daily.  3     clindamycin (CLINDAGEL) 1 % gel Apply topically.       clotrimazole (LOTRIMIN) 1 % cream Apply topically.       CONTOUR NEXT TEST STRIPS strips TEST FID  3     cyclobenzaprine (FLEXERIL) 5 MG tablet Take 5-10 mg by mouth.       diphenhydrAMINE (BENADRYL) 12.5 mg/5 mL liquid Take 12.5 mg by mouth.       DULoxetine (CYMBALTA) 60 MG capsule Take 60 mg by mouth.       empagliflozin 25 mg Tab Take 1 tablet by mouth daily.       ferrous gluconate 324 mg (37.5 mg iron) Tab Take 1 tablet by mouth daily.       finasteride (PROSCAR) 5 mg tablet Take 5 mg by mouth daily.       folic acid (FOLVITE) 1 MG tablet Take 1 mg by mouth daily.       gabapentin (NEURONTIN) 400 MG capsule Take 800 mg by mouth 3 (three) times a day. Indications: Neuropathic Pain       generic lancets (ACCU-CHEK MULTICLIX LANCET) Test five times daily as directed       glimepiride (AMARYL) 4 MG tablet TAKE 1 TABLET(4 MG) BY MOUTH TWICE DAILY       guaiFENesin (HUMIBID 3) 400 mg Tab Take 400 mg by mouth every 4 (four) hours as needed.       hydrocortisone 1 % cream Apply 1 application topically as needed.       inFLIXimab (REMICADE) 100 mg injection Infuse 600 mg into a venous catheter every 2 (two) months.       insulin regular hum U-500 conc, HumuLIN R, (HUMULIN R CONC) 500 unit/mL CONCENTRATED injection USING A U100 SYRINGE DRAW UP 50 UNITS FOR SUBCUTANEOUS INJECTION BEFORE BREAKFAST, 60 UNITS BEFORE DINNER AND 20 UNITS AT MIDNIGHT  Indications: Diabetes Mellitus with Severe Insulin Resistance       insulin syringe-needle U-100 1 mL 31 gauge x 5/16 Syrg TEST TID.  0     insulin syringe-needle U-100 1/2 mL 31 x 5/16\" Syrg        " "insulin U-500 syringe-needle (BD INSULIN SYRINGE U-500) 1/2 mL 31 gauge x 15/64\" Syrg Use to give insulin injections 2 times daily.       lactic acid-urea Lotn Apply to crusting, thickened areas of skin twice a day until clear. 60 mL 3     levETIRAcetam (KEPPRA) 750 MG tablet Take 750 mg by mouth 2 (two) times a day.  2     lidocaine (LIDODERM) 5 % Place 1 patch on the skin daily.       liraglutide (VICTOZA 2-KAYLYNN) 0.6 mg/0.1 mL (18 mg/3 mL) PnIj injection Inject 1.8 mg under the skin daily.       lisinopril (PRINIVIL,ZESTRIL) 20 MG tablet Take 20 mg by mouth daily.  1     loratadine (CLARITIN) 10 mg tablet Take 10 mg by mouth daily.       magnesium gluconate 30 mg (550 mg) Tab Take 1 tablet by mouth.       magnesium oxide 250 mg Tab Take 500 mg by mouth 3 (three) times a week.        metFORMIN (GLUCOPHAGE) 500 MG tablet TAKE 2 TABLETS BY MOUTH TWICE DAILY WITH BREAKFAST AND DINNER       methocarbamol (ROBAXIN) 500 MG tablet 1 tab three times a day as needed       methotrexate 2.5 MG tablet Take 10 mg by mouth once a week.       metoprolol succinate (TOPROL-XL) 25 MG Take 25 mg by mouth 2 (two) times a day.       miscellaneous medical supply Misc Bipap, heated humidifer, mask, headgear, filters and tubing. Pressure 19/13.  For home use. Length of need: 99       NASONEX 50 mcg/actuation nasal spray SHAKE LQ AND U 2 SPRAYS IEN BID  4     nitroglycerin (NITROSTAT) 0.4 MG SL tablet Place 1 tablet under the tongue as needed.       nystatin (MYCOSTATIN) powder Apply topically as needed.       omeprazole (PRILOSEC) 20 MG capsule Take 20 mg by mouth.       oxyCODONE (ROXICODONE) 5 MG immediate release tablet Take 5 mg by mouth.       pantoprazole (PROTONIX) 40 MG tablet Take 40 mg by mouth 2 (two) times a day. Indications: Gastric Ulcer       pen needle, diabetic (BD ULTRA-FINE PAPO PEN NEEDLE) 32 gauge x 5/32\" Ndle Use as directed with Insulin Pen and Victoza       potassium chloride (KLOR-CON) 10 MEQ CR tablet Take 40 mEq " by mouth daily. 3 tablets at lunch, 3 tablets at dinner.       prochlorperazine (COMPAZINE) 10 MG tablet Take 10 mg by mouth every 8 (eight) hours as needed.       semaglutide 0.25 mg or 0.5 mg(2 mg/1.5 mL) PnIj Inject 0.5 mg under the skin every 7 days.       spironolactone (ALDACTONE) 50 MG tablet Take 100 mg by mouth daily.       SYNJARDY XR 12.5-1,000 mg TBph TK 1 T PO BID. REPLACING INVOKAMET  11     tamsulosin (FLOMAX) 0.4 mg Cp24 Take 0.4 mg by mouth daily.       terbinafine HCl (LAMISIL AT) 1 % cream Apply 1 application topically. To legs PRN       urea-alpha hydroxy acids (ATRAC-TAIN) 10-4 % Crea Apply topically.       warfarin (COUMADIN) 3 MG tablet Take 4 mg by mouth daily.       zinc gluconate 50 mg tablet Take 1 tablet by mouth.       JARDIANCE 25 mg Tab Take 1 tablet by mouth daily.  0     loperamide (IMODIUM) 2 mg capsule Take 2 mg by mouth every 4 (four) hours as needed.       multivitamin-iron-folic acid  mg-mcg Tab Take 1 tablet by mouth daily.       polymyxin B-trimethoprim (FOR POLYTRIM) 10,000 unit- 1 mg/mL Drop ophthalmic drops polymyxin B sulfate 10,000 unit-trimethoprim 1 mg/mL eye drops       tobramycin-dexamethasone (TOBRADEX) ophthalmic solution Administer 1 drop to both eyes.       Current Facility-Administered Medications   Medication Dose Route Frequency Provider Last Rate Last Dose     lidocaine 2 % jelly (XYLOCAINE)   Topical PRN Laura Levy MD   1 application at 10/24/16 1235       Allergies   Allergen Reactions     Aspirin Other (See Comments)     Not allergic. Patient is on coumadin  Patient on coumadin       Clindamycin Other (See Comments)     Significant heartburn when taking 300 mg four times per day .     Levaquin [Levofloxacin] Other (See Comments)     Muscle burn per pt.     Lovastatin Myalgia     LW Reaction: myalgias     Other Allergy (See Comments) Other (See Comments)     PN: LW FI1: nka  PN: LW CM1: Contrast Adverse Reaction - None Reaction :  PN: LW  CM1: Contrast Adverse Reaction - None Reaction :       Social History     Socioeconomic History     Marital status: Single     Spouse name: Not on file     Number of children: Not on file     Years of education: Not on file     Highest education level: Not on file   Occupational History     Not on file   Social Needs     Financial resource strain: Not on file     Food insecurity:     Worry: Not on file     Inability: Not on file     Transportation needs:     Medical: Not on file     Non-medical: Not on file   Tobacco Use     Smoking status: Never Smoker     Smokeless tobacco: Never Used   Substance and Sexual Activity     Alcohol use: No     Comment: rare socially, not in years.     Drug use: No     Sexual activity: Never   Lifestyle     Physical activity:     Days per week: Not on file     Minutes per session: Not on file     Stress: Not on file   Relationships     Social connections:     Talks on phone: Not on file     Gets together: Not on file     Attends Taoism service: Not on file     Active member of club or organization: Not on file     Attends meetings of clubs or organizations: Not on file     Relationship status: Not on file     Intimate partner violence:     Fear of current or ex partner: Not on file     Emotionally abused: Not on file     Physically abused: Not on file     Forced sexual activity: Not on file   Other Topics Concern     Not on file   Social History Narrative    Single.  No kids.  Retired .       Family History   Problem Relation Age of Onset     Arthritis Mother      Heart disease Father      Diabetes Sister      Cancer Sister      Uterine cancer Sister      Diabetes Sister      No Medical Problems Sister      Stroke Brother      Review of Systems:    Karsten Mccain no new numbess, tingling or weakness, redness or rashes, fevers, new masses, abdominal bloating or discomfort, unexplained weight loss, increased pain, new ulcers, shortness of breath and chest  pain  Full 12 point review of systems was completed.    Labs:    I personally reviewed the following lab results today and those on care everywhere, if indicated    11/13/2019 creatinine 1.22 BUN 21 glucose 140 hemoglobin 16.3 platelets 171    4/3/2019 hemoglobin A1c 10.1    No results found for: SEDRATE      No results found for: CRP        Lab Results   Component Value Date    CREATININE 1.12 01/17/2017      No results found for: HGBA1C        Lab Results   Component Value Date    BUN 22 01/17/2017              No results found for: LABPROT, ALBUMIN    No results found for: [id number]    No results found for: TSH  No results found for: WBC, HGB, HCT, MCV, PLT     Imaging:      I personally reviewed the following imaging results today and those on care everywhere, if indicated    Aniket, Rad Results In - 10/10/2019  2:21 PM CDT  IMPRESSION  COMPARISON:  05/31/2017    FINDINGS:  No significant change to the narrowing of the right hip joint space with spurring of the acetabulum. The SI joints appear partially fused.  Interface, [employer] - 08/03/2018  7:40 AM CDT  Clinical History: F/U Intracranial Bleed    Technique: Noncontrast axial CT images are obtained of the brain. This CT Scan used dose modulation, iterative reconstruction, and/or weight based dosing when appropriate to reduce radiation dose to as low as reasonably achievable.    Comparison: 8/2/2018 head CT    Findings: Right frontal intraparenchymal hematoma measures 3.0 x 3.6 cm (series 2 image 22) compared to 2.7 x 3.6 cm on the study of one day prior. There is surrounding vasogenic edema. There is small amount of extra-axial hemorrhage overlying the right frontal lobe and extending into several subjacent sulci. There is local sulcal effacement. No significant mass effect. No midline shift. Ventricles normal size. Basal cisterns are widely patent.    Encephalomalacia in the left corona radiata and basal ganglia region with subjacent ex vacuo frontal horn  dilatation.    No calvarial fracture. Paranasal sinuses and temporal bone structures are well aerated. There is atlantodental joint osteoarthrosis. Skull base structures are intact.    Impression:   1. Grossly stable right frontal intraparenchymal hemorrhage with surrounding vasogenic edema and small amount of extra-axial hemorrhage overlying the right frontal lobe and extending to several adjacent sulci. No significant mass effect.  2. Left cerebral hemispheric encephalomalacia.   3. The right frontal hematoma is an atypical location for hypertensive bleed. Hemorrhage related to underlying mass lesion, vascular malformation or cerebral amyloid angiopathy are considerations. Consider further evaluation with brain MRI/MRA.    [facility]  Nothing new to review    Physical Exam:  Vitals:    12/05/19 1400   BP: 102/54   Pulse: 68   Resp: 24   Temp: 98.2  F (36.8  C)     BMI 57.62 (decreased) weight 357 pounds (-19)    Circumferential measures:    Vasc Edema 9/28/2017 3/29/2018 11/29/2018 6/3/2019 12/5/2019   Right just above MTP 24.7 34 34.5 33.3 33   Right Ankle 38.2 35 35.3 35 35.5   Right Widest Calf 53.3 51.5  53.8 50.7 51.3   Right Thigh Up 10cm 74.5   72.5 70.9 -   Left - just above MTP 35.5 35 35 34 33.6   Left Ankle 38.2 34.5 35.8 34.8 33.5   Left Widest Calf 52.3 52.5 54.4 47.8 51.3   Left Thigh Up 10cm 72.9 - 76.2 70.8 -     Measures are stable.    General:  64 y.o. male in no apparent distress.      Psych:  Alert and oriented x 3.  Cooperative. Affect normal.    HEENT: Atraumatic and normocephalic.    Vascular: There are no significant telangietasias, medial ankle venous flares, venous varicosities  and spider veins .     Neurological:  Decreased to PP and LT in the feet bilaterally.  Strength normal in knee flexion/extentsion, ankle dorsiflexion and great toe extension bilaterally.      Integumentary: Skin of the legs is uniformly warm and dry, erythematous, with papillomatosis,  with hyperkeratosis and keratoderma and with positive Stemmer's Sign in the bilateral toes.  The hyperkeratosis continues to decrease but is still mainly around the ankles more so on the left than the right.  There are no open ulcerations.  There is no weeping in either leg or foot.  Toenails are hypertrophied.  Skin of the legs and feet continue to be improved from his baseline and is fairly soft now.  There is no pain to palpation.    Laura Levy MD, ABWMS, FACCWS, [facility]  Medical Director Wound Care and Lymphedema  [facility]  [phone]

## 2021-06-04 NOTE — PROGRESS NOTES
Pt continues to wear bilateral velcro compression. Complaint pain with leg, and its tender to touch, no redness noted.Pt has lost 19 lbs since last visit.

## 2021-06-05 VITALS
WEIGHT: 315 LBS | HEART RATE: 78 BPM | BODY MASS INDEX: 58.94 KG/M2 | DIASTOLIC BLOOD PRESSURE: 70 MMHG | RESPIRATION RATE: 20 BRPM | TEMPERATURE: 98.5 F | SYSTOLIC BLOOD PRESSURE: 138 MMHG

## 2021-06-09 NOTE — PROGRESS NOTES
Date of Service: 03/27/17    Date last seen:  12/28/2016    PCP: Irving Hodgson MD    Impression:   1.   Bilateral leg swelling   2.   Secondary lymphedema-Stage 3 severe -elephantiasis  3.   Venous hypertension bilateral legs  4.   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   Distal foot ulcerations-healed  6.   Hypertrophy of toenails  7.   Long term anticoagulation  8.   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   Complicated by morbid obesity   10. Complicated by type 2 diabetes with peripheral neuropathy.     Plan:   1.  Questions were answered.  2.  Continue velcro compression.  3.  Continue lac hydrin for hyperkeratosis.  Some removed.   4.   Elevate legs as much as able.  5.   Patient will follow up in 6 months or when needed.     Time spent with patient 15 minutes with greater than 50% time in consultation, education and coordination of care.   ---------------------------------------------------------------------------------------------------------------------     Chief Complaint: Bilateral leg swelling and toe ulcerations    History of Present Illness:   Karsten Mccain returns to the [facility] for follow up of bilateral leg swelling toe ulcerations. This is complicated by severe secondary lymphedema stage III with fibrosis scarring with keratoderma and papillomatosis. He has right leg paresis status post old left CVA. He has type 2 diabetes with peripheral neuropathy and is supra-morbidly obese. He is regularly wearing his velcro compression which works well for him. There is no weeping. He uses the lac hydrin cream and this is going well.  Swelling is under good control.  He has not seen bariatrics.    There has been no new numbness, tingling, weakness, masses, rashes, shortness of breath or chest pain. There has been no new fevers or pain.  Pain is rated a 0 on a 10 point scale.      Past Medical History:   Diagnosis Date     Abscess or cellulitis of  leg 2/27/2015     Acquired lymphedema 1/11/2011     Activated protein C resistance 1/16/2011     Advance directive discussed with patient 12/20/2010    Overview:  Patient has identified Health Care Agent(s): Yes Add Health Care Agents: Yes   Health Care Agent(s): Primary Health Care Agent: Miguel Mccain Relationship: Father Phone: [phone]  Secondary Health Care Agent: Ana Lilia Mccain Relationship: Mother Phone: [phone]     Patient has Advanced Care Plan Documents (Health Care Directive, POLST): Yes   Advance Care Plan Documents: Health     Airway hyperreactivity 2/27/2013     Amnesia 9/2/2010     Ankylosing polyarthritis 6/1/2006    Overview:  On methotrexate and remicade  Overview:  diagnosed 1982 sees Dr. Reis      Apnea, sleep 6/13/2006    Overview:  Uses nasal BIPAP inactive icd-9 diagnosis auto replaced with icd-10, display name retained//mporz      Arthralgia of hip 3/5/2013     Avitaminosis D 3/8/2012    Overview:  inactive icd-9 diagnosis auto replaced with icd-10, display name retained//mporz      Benign prostatic hyperplasia with urinary obstruction 2/4/2008     Cannot sleep 11/6/2008    Overview:  inactive icd-9 diagnosis auto replaced with icd-10, display name retained//mporz      Cardiac failure right 3/30/2011     Cerebral infarct 1/11/2011    Overview:  Basal ganglion       Clotting disorder      Combined fat and carbohydrate induced hyperlipemia 11/25/2010     Diabetes 1/11/2011    Overview:  HEMOGLOBIN A1C (%)  Date Value  11/27/2010 9.1*  7/1/2004 6.2        Diabetic neuropathy 3/1/2011     Disturbance of skin sensation 11/2/2015     DU (duodenal ulcer) 3/29/2015     Encounter for therapeutic drug monitoring 11/5/2013     Essential (primary) hypertension 11/2/2015    Overview:  inactive icd-9 diagnosis auto replaced with icd-10, display name retained//mporz      Extreme obesity 11/2/2015     Foot ulcer 3/5/2013     Hypertensive pulmonary vascular disease 1/11/2012    fused rib cage  causing restriction and pulm htn related RV failure.     Stroke     right leg and arm weakness around 2010/2011 he thinks., has poor memory and his initial aphasia as well that has improved.       Past Surgical History:   Procedure Laterality Date     CHOLECYSTECTOMY       colostomy         Current Outpatient Prescriptions   Medication Sig Dispense Refill     acetaminophen (TYLENOL) 500 MG tablet Take 1,000 mg by mouth every 8 (eight) hours.        ammonium lactate (LAC-HYDRIN) 12 % lotion        amoxicillin (AMOXIL) 500 MG capsule Take 500 mg by mouth as needed. Prior to dental work       atorvastatin (LIPITOR) 40 MG tablet Take 40 mg by mouth daily. Indications: Hypercholesterolemia       bumetanide (BUMEX) 1 MG tablet Take 2 mg by mouth daily.       canagliflozin (INVOKANA) 300 mg Tab Take 300 mg by mouth daily. Indications: Type 2 Diabetes Mellitus       clindamycin (CLINDAGEL) 1 % gel Apply topically.       clotrimazole (LOTRIMIN) 1 % cream Apply topically.       diphenhydrAMINE (BENADRYL) 12.5 mg/5 mL liquid Take 12.5 mg by mouth.       DULoxetine (CYMBALTA) 60 MG capsule Take 60 mg by mouth.       ferrous gluconate 324 mg (37.5 mg iron) Tab Take 1 tablet by mouth daily.       finasteride (PROSCAR) 5 mg tablet Take 5 mg by mouth daily.       folic acid (FOLVITE) 1 MG tablet Take 1 mg by mouth daily.       gabapentin (NEURONTIN) 400 MG capsule Take 800 mg by mouth 3 (three) times a day. Indications: Neuropathic Pain       guaiFENesin (HUMIBID 3) 400 mg Tab Take 400 mg by mouth every 4 (four) hours as needed.       hydrocortisone 1 % cream Apply 1 application topically as needed.       inFLIXimab (REMICADE) 100 mg injection Infuse 600 mg into a venous catheter every 2 (two) months.       insulin regular hum U-500 conc, HumuLIN R, (HUMULIN R CONC) 500 unit/mL CONCENTRATED injection USING A U100 SYRINGE DRAW UP 50 UNITS FOR SUBCUTANEOUS INJECTION BEFORE BREAKFAST, 60 UNITS BEFORE DINNER AND 20 UNITS AT MIDNIGHT   "Indications: Diabetes Mellitus with Severe Insulin Resistance       insulin syringe-needle U-100 1/2 mL 31 x 5/16\" Syrg        lactic acid-urea Lotn Apply to crusting, thickened areas of skin twice a day until clear. 60 mL 3     lidocaine (LIDODERM) 5 % Place 1 patch on the skin daily.       liraglutide (VICTOZA 2-KAYLYNN) 0.6 mg/0.1 mL (18 mg/3 mL) PnIj injection Inject 1.8 mg under the skin daily.       loratadine (CLARITIN) 10 mg tablet Take 10 mg by mouth daily.       magnesium oxide 250 mg Tab Take 500 mg by mouth 3 (three) times a week.        methotrexate 2.5 MG tablet Take 10 mg by mouth once a week.       metoprolol succinate (TOPROL-XL) 25 MG Take 25 mg by mouth 2 (two) times a day.       nitroglycerin (NITROSTAT) 0.4 MG SL tablet Place 1 tablet under the tongue as needed.       nystatin (MYCOSTATIN) powder Apply topically as needed.       omeprazole (PRILOSEC) 20 MG capsule Take 20 mg by mouth.       potassium chloride (KLOR-CON) 10 MEQ CR tablet Take 40 mEq by mouth daily. 3 tablets at lunch, 3 tablets at dinner.       prochlorperazine (COMPAZINE) 10 MG tablet Take 10 mg by mouth every 8 (eight) hours as needed.       spironolactone (ALDACTONE) 50 MG tablet Take 100 mg by mouth daily.       tamsulosin (FLOMAX) 0.4 mg Cp24 Take 0.4 mg by mouth daily.       terbinafine HCl (LAMISIL AT) 1 % cream Apply 1 application topically. To legs PRN       warfarin (COUMADIN) 1 MG tablet Take 3 tablets by mouth daily. Take  Sun. 3 mg, Mon. 3 mg, Tues. 4 mg, Wed. 3 mg, Thurs. 3 mg, Fri. 4 mg, Sat.3 mg       baclofen (LIORESAL) 10 MG tablet TK 1 T PO TID  5     pantoprazole (PROTONIX) 40 MG tablet Take 40 mg by mouth 2 (two) times a day. Indications: Gastric Ulcer       Current Facility-Administered Medications   Medication Dose Route Frequency Provider Last Rate Last Dose     lidocaine 2 % jelly (XYLOCAINE)   Topical PRN Laura Levy MD   1 application at 10/24/16 5595       Allergies   Allergen Reactions     " Aspirin Other (See Comments)     Not allergic. Patient is on coumadin  Patient on coumadin       Clindamycin Other (See Comments)     Significant heartburn when taking 300 mg four times per day .     Levaquin [Levofloxacin] Other (See Comments)     Muscle burn per pt.     Lovastatin Myalgia     LW Reaction: myalgias     Other Allergy (See Comments) Other (See Comments)     PN: LW FI1: nka  PN: LW CM1: Contrast Adverse Reaction - None Reaction :  PN: LW CM1: Contrast Adverse Reaction - None Reaction :       Social History     Social History     Marital status: Single     Spouse name: N/A     Number of children: N/A     Years of education: N/A     Occupational History     Not on file.     Social History Main Topics     Smoking status: Never Smoker     Smokeless tobacco: Never Used     Alcohol use No      Comment: rare socially, not in years.     Drug use: No     Sexual activity: No     Other Topics Concern     Not on file     Social History Narrative    Single.  No kids.  Retired .       Family History   Problem Relation Age of Onset     Arthritis Mother      Heart disease Father      Diabetes Sister      Cancer Sister      Uterine cancer Sister      Diabetes Sister      No Medical Problems Sister      Stroke Brother      Review of Systems:  Karsten Mccain no new numbess, tingling or weakness, redness or rashes, fevers, new masses, abdominal bloating or discomfort, unexplained weight loss, increased pain, new ulcers, shortness of breath and chest pain  Full 12 point review of systems was completed.    Physical Exam:  Vitals:    03/27/17 1306   BP: 110/60   Pulse: 60   Resp: 16   Temp: 98.4  F (36.9  C)    There is no height or weight on file to calculate BMI.    Circumferential measures:    Vasc Edema 4/4/2016 9/7/2016 10/24/2016 12/28/2016 3/27/2017   Right just above MTP 36.6 37 36 32.5 35   Right Ankle 41 41.7 38.5 38.8 35.5   Right Widest Calf 53.5 54.5 52 53.0 55   Right Thigh Up 10cm 74.2  74.3 75.3 74.0 71.3   Left - just above MTP 35.8 36.8 35.4 33.0 35.7   Left Ankle 44.2 41.7 38.4 37.4 35.6   Left Widest Calf 54.5 54.9 52 48.2 53.2   Left Thigh Up 10cm 74 73.4 74.4 71.4 71.8     Measures are looking very good.    General:  61 y.o. male in no apparent distress.  Alert and oriented x 3.  Cooperative. Affect normal.    Vascular: There are no significant telangietasias, medial ankle venous flares, venous varicosities  and spider veins . There is normal capillary refill.     Integumentary: Skin of the legs is uniformly warm and dry, erythematous, with papillomatosis, with hyperkeratosis and keratoderma and with positive Stemmer's Sign in the bilateral toes.  There is no weeping in either leg or foot. Hyperkeratosis continues to decreased.   Toenails are hypertrophied.  Skin of the legs and feet overall looks much healthier.  No open ulcerations are noted.    Laura Levy MD, ABWMS, FACCWS, [facility]  Medical Director Wound Care and Lymphedema  [facility]  [phone]

## 2021-06-13 NOTE — PROGRESS NOTES
Date of Service: 09/28/17    Date last seen: 03/27/17    PCP: Irving Hodgson MD    Impression:   1.   Bilateral leg swelling   2.   Secondary lymphedema-Stage 3 severe -elephantiasis  3.   Venous hypertension bilateral legs  4.   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   Distal foot ulcerations-healed  6.   Hypertrophy of toenails  7.   Long term anticoagulation  8.   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   Complicated by morbid obesity   10. Complicated by type 2 diabetes with peripheral neuropathy  11.  Hyperkeratosis of the legs -improved    Plan:   1.  Questions were answered.  2.  Continue velcro compression.  3.  Continue lac hydrin for hyperkeratosis.  Reviewed appropriate use.  Some hyperatosis removed again today.   4.   Elevate legs as much as able.  5.   Patient will follow up in 6 months or when needed.     Time spent with patient 15 minutes with greater than 50% time in consultation, education and coordination of care.   ---------------------------------------------------------------------------------------------------------------------     Chief Complaint: Bilateral leg swelling and toe ulcerations    History of Present Illness:   Karsten Mccain returns to the [facility] for follow up of bilateral leg swelling with toe ulcerations. He is here with his sister. This is complicated by severe secondary lymphedema stage III, elephantiasis, with fibrosis scarring with keratoderma and papillomatosis. He has right leg paresis status post old left CVA. He has type 2 diabetes with peripheral neuropathy and is supra-morbidly obese. He is regularly wearing his velcro compression which works well for him. There is no weeping. He uses the lac hydrin cream.  This continues to soften the hyperkeratosis.    Swelling is under good control.  He and his family are pleased.   There has been no new numbness, tingling, weakness, masses, rashes, shortness of breath  or chest pain. There has been no new fevers or pain.  Pain is rated a 0 on a 10 point scale.      Past Medical History:   Diagnosis Date     Abscess or cellulitis of leg 2/27/2015     Acquired lymphedema 1/11/2011     Activated protein C resistance 1/16/2011     Advance directive discussed with patient 12/20/2010    Overview:  Patient has identified Health Care Agent(s): Yes Add Health Care Agents: Yes   Health Care Agent(s): Primary Health Care Agent: Miguel Mccain Relationship: Father Phone: [phone]  Secondary Health Care Agent: Ana Lilia Mccain Relationship: Mother Phone: [phone]     Patient has Advanced Care Plan Documents (Health Care Directive, POLST): Yes   Advance Care Plan Documents: Health     Airway hyperreactivity 2/27/2013     Amnesia 9/2/2010     Ankylosing polyarthritis 6/1/2006    Overview:  On methotrexate and remicade  Overview:  diagnosed 1982 sees Dr. Reis      Apnea, sleep 6/13/2006    Overview:  Uses nasal BIPAP inactive icd-9 diagnosis auto replaced with icd-10, display name retained//mporz      Arthralgia of hip 3/5/2013     Avitaminosis D 3/8/2012    Overview:  inactive icd-9 diagnosis auto replaced with icd-10, display name retained//mporz      Benign prostatic hyperplasia with urinary obstruction 2/4/2008     Cannot sleep 11/6/2008    Overview:  inactive icd-9 diagnosis auto replaced with icd-10, display name retained//mporz      Cardiac failure right 3/30/2011     Cerebral infarct 1/11/2011    Overview:  Basal ganglion       Clotting disorder      Combined fat and carbohydrate induced hyperlipemia 11/25/2010     Diabetes 1/11/2011    Overview:  HEMOGLOBIN A1C (%)  Date Value  11/27/2010 9.1*  7/1/2004 6.2        Diabetic neuropathy 3/1/2011     Disturbance of skin sensation 11/2/2015     DU (duodenal ulcer) 3/29/2015     Encounter for therapeutic drug monitoring 11/5/2013     Essential (primary) hypertension 11/2/2015    Overview:  inactive icd-9 diagnosis auto replaced with  icd-10, display name retained//mporz      Extreme obesity 11/2/2015     Foot ulcer 3/5/2013     Hypertensive pulmonary vascular disease 1/11/2012    fused rib cage causing restriction and pulm htn related RV failure.     Stroke     right leg and arm weakness around 2010/2011 he thinks., has poor memory and his initial aphasia as well that has improved.       Past Surgical History:   Procedure Laterality Date     CHOLECYSTECTOMY       colostomy         Current Outpatient Prescriptions   Medication Sig Dispense Refill     acetaminophen (TYLENOL) 500 MG tablet Take 1,000 mg by mouth every 8 (eight) hours.        acetic acid (VOSOL) 2 % otic solution Administer into ears.       ammonium lactate (LAC-HYDRIN) 12 % lotion        amoxicillin (AMOXIL) 500 MG capsule Take 500 mg by mouth as needed. Prior to dental work       amoxicillin (AMOXIL) 500 MG capsule Take 1,000 mg by mouth.       amoxicillin-clavulanate (AUGMENTIN) 875-125 mg per tablet Take 1 tablet by mouth.       atorvastatin (LIPITOR) 40 MG tablet Take 40 mg by mouth daily. Indications: Hypercholesterolemia       baclofen (LIORESAL) 10 MG tablet TK 1 T PO TID  5     bumetanide (BUMEX) 1 MG tablet Take 2 mg by mouth daily.       canagliflozin (INVOKANA) 300 mg Tab Take 300 mg by mouth daily. Indications: Type 2 Diabetes Mellitus       canagliflozin-metformin 150-1,000 mg Tab Take 1 tablet by mouth.       clindamycin (CLINDAGEL) 1 % gel Apply topically.       clotrimazole (LOTRIMIN) 1 % cream Apply topically.       diphenhydrAMINE (BENADRYL) 12.5 mg/5 mL liquid Take 12.5 mg by mouth.       DULoxetine (CYMBALTA) 60 MG capsule Take 60 mg by mouth.       ferrous gluconate 324 mg (37.5 mg iron) Tab Take 1 tablet by mouth daily.       finasteride (PROSCAR) 5 mg tablet Take 5 mg by mouth daily.       folic acid (FOLVITE) 1 MG tablet Take 1 mg by mouth daily.       gabapentin (NEURONTIN) 400 MG capsule Take 800 mg by mouth 3 (three) times a day. Indications: Neuropathic  "Pain       guaiFENesin (HUMIBID 3) 400 mg Tab Take 400 mg by mouth every 4 (four) hours as needed.       hydrocortisone 1 % cream Apply 1 application topically as needed.       inFLIXimab (REMICADE) 100 mg injection Infuse 600 mg into a venous catheter every 2 (two) months.       insulin regular hum U-500 conc, HumuLIN R, (HUMULIN R CONC) 500 unit/mL CONCENTRATED injection USING A U100 SYRINGE DRAW UP 50 UNITS FOR SUBCUTANEOUS INJECTION BEFORE BREAKFAST, 60 UNITS BEFORE DINNER AND 20 UNITS AT MIDNIGHT  Indications: Diabetes Mellitus with Severe Insulin Resistance       insulin syringe-needle U-100 1/2 mL 31 x 5/16\" Syrg        lactic acid-urea Lotn Apply to crusting, thickened areas of skin twice a day until clear. 60 mL 3     lidocaine (LIDODERM) 5 % Place 1 patch on the skin daily.       liraglutide (VICTOZA 2-KAYLYNN) 0.6 mg/0.1 mL (18 mg/3 mL) PnIj injection Inject 1.8 mg under the skin daily.       loratadine (CLARITIN) 10 mg tablet Take 10 mg by mouth daily.       magnesium oxide 250 mg Tab Take 500 mg by mouth 3 (three) times a week.        methotrexate 2.5 MG tablet Take 10 mg by mouth once a week.       metoprolol succinate (TOPROL-XL) 25 MG Take 25 mg by mouth 2 (two) times a day.       NASONEX 50 mcg/actuation nasal spray SHAKE LQ AND U 2 SPRAYS IEN BID  4     nitroglycerin (NITROSTAT) 0.4 MG SL tablet Place 1 tablet under the tongue as needed.       nystatin (MYCOSTATIN) powder Apply topically as needed.       omeprazole (PRILOSEC) 20 MG capsule Take 20 mg by mouth.       oxyCODONE (ROXICODONE) 5 MG immediate release tablet Take 5 mg by mouth.       pantoprazole (PROTONIX) 40 MG tablet Take 40 mg by mouth 2 (two) times a day. Indications: Gastric Ulcer       potassium chloride (KLOR-CON) 10 MEQ CR tablet Take 40 mEq by mouth daily. 3 tablets at lunch, 3 tablets at dinner.       prochlorperazine (COMPAZINE) 10 MG tablet Take 10 mg by mouth every 8 (eight) hours as needed.       spironolactone (ALDACTONE) 50 " MG tablet Take 100 mg by mouth daily.       tamsulosin (FLOMAX) 0.4 mg Cp24 Take 0.4 mg by mouth daily.       terbinafine HCl (LAMISIL AT) 1 % cream Apply 1 application topically. To legs PRN       urea-alpha hydroxy acids (ATRAC-TAIN) 10-4 % Crea Apply topically.       warfarin (COUMADIN) 1 MG tablet Take 3 tablets by mouth daily. Take  Sun. 3 mg, Mon. 3 mg, Tues. 4 mg, Wed. 3 mg, Thurs. 3 mg, Fri. 4 mg, Sat.3 mg       Current Facility-Administered Medications   Medication Dose Route Frequency Provider Last Rate Last Dose     lidocaine 2 % jelly (XYLOCAINE)   Topical PRN Laura Levy MD   1 application at 10/24/16 1235       Allergies   Allergen Reactions     Aspirin Other (See Comments)     Not allergic. Patient is on coumadin  Patient on coumadin       Clindamycin Other (See Comments)     Significant heartburn when taking 300 mg four times per day .     Levaquin [Levofloxacin] Other (See Comments)     Muscle burn per pt.     Lovastatin Myalgia     LW Reaction: myalgias     Other Allergy (See Comments) Other (See Comments)     PN: LW FI1: nka  PN: LW CM1: Contrast Adverse Reaction - None Reaction :  PN: LW CM1: Contrast Adverse Reaction - None Reaction :       Social History     Social History     Marital status: Single     Spouse name: N/A     Number of children: N/A     Years of education: N/A     Occupational History     Not on file.     Social History Main Topics     Smoking status: Never Smoker     Smokeless tobacco: Never Used     Alcohol use No      Comment: rare socially, not in years.     Drug use: No     Sexual activity: No     Other Topics Concern     Not on file     Social History Narrative    Single.  No kids.  Retired .       Family History   Problem Relation Age of Onset     Arthritis Mother      Heart disease Father      Diabetes Sister      Cancer Sister      Uterine cancer Sister      Diabetes Sister      No Medical Problems Sister      Stroke Brother      Review of  Systems:  Karsten Mccain no new numbess, tingling or weakness, redness or rashes, fevers, new masses, abdominal bloating or discomfort, unexplained weight loss, increased pain, new ulcers, shortness of breath and chest pain  Full 12 point review of systems was completed.    Physical Exam:  Vitals:    09/28/17 1306   BP: 120/70   Pulse: 64   Resp: 16   Temp: 98.2  F (36.8  C)     BMI 57.29  Circumferential measures:    Vasc Edema 9/7/2016 10/24/2016 12/28/2016 3/27/2017 9/28/2017   Right just above MTP 37 36 32.5 35 24.7   Right Ankle 41.7 38.5 38.8 35.5 38.2   Right Widest Calf 54.5 52 53.0 55 53.3   Right Thigh Up 10cm 74.3 75.3 74.0 71.3 74.5   Left - just above MTP 36.8 35.4 33.0 35.7 35.5   Left Ankle 41.7 38.4 37.4 35.6 38.2   Left Widest Calf 54.9 52 48.2 53.2 52.3   Left Thigh Up 10cm 73.4 74.4 71.4 71.8 72.9     Measures are looking very good.    General:  61 y.o. male in no apparent distress.  Alert and oriented x 3.  Cooperative. Affect normal.    Vascular: There are no significant telangietasias, medial ankle venous flares, venous varicosities  and spider veins . There is normal capillary refill.     Integumentary: Skin of the legs is uniformly warm and dry, erythematous, with papillomatosis, with hyperkeratosis and keratoderma and with positive Stemmer's Sign in the bilateral toes.  The hyperkeratosis has decreased.  There is no weeping in either leg or foot.  Toenails are hypertrophied.  Skin of the legs and feet looks good from his baseline.  No open ulcerations are noted.    Laura Levy MD, ABWMS, FACCWS, [facility]  Medical Director Wound Care and Lymphedema  [facility]  [phone]

## 2021-06-15 NOTE — PATIENT INSTRUCTIONS - HE
"    Continue compression and using lac hydrin lotion.    Swelling in the legs can be caused by many reasons. No matter what the reason, treatment usually includes some type of compression. You should wear your compression socks as much as you can. Your compression should be put on first thing in the morning. Take the compression off at night. It is especially important to wear them with long periods of sitting/standing, long car rides or if you will be flying. Going without compression for even brief periods of time can be damaging to your legs and your health.  Compression socks should get replaced usually every 4-6 months. They do not need to be worn at night while in bed. If we have seen you in the last year, we can refill your sock prescription, otherwise your primary care provider is able to refill them for you. Call us with any problems or questions.   Compression Velcro may need to be replaced every 9-12 months.  Often the liners and socks need to be replaced every three or four months.  The neoprene velcro may last up to 2 years.  If the velcro becomes worn a tailor may be able to repair it for you inexpensively.    If you do a lot of standing, it is good to do calf raises to help keep the blood pumping. If you sit a lot at work, it is good to get up periodically to walk around. Elevation of the foot of your bed 4-6\" helps the blood return back to where it is needed.   Please call us if you have any questions [phone]    Thank you for choosing [facility].  "

## 2021-06-16 PROBLEM — I69.052: Status: ACTIVE | Noted: 2021-02-22

## 2021-06-16 PROBLEM — I87.2 VENOUS INSUFFICIENCY OF BOTH LOWER EXTREMITIES: Status: ACTIVE | Noted: 2017-09-28

## 2021-06-17 NOTE — PROGRESS NOTES
Date of Service: 03/29/18    Date last seen: 09/28/17    PCP: Irving Hodgson MD    Impression:   1.   Bilateral leg swelling -doing much better  2.   Secondary lymphedema-Stage 3 severe -elephantiasis  3.   Venous hypertension bilateral legs  4.   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   Hypertrophy of toenails  6.   Long term anticoagulation  7.   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   Complicated by morbid obesity   9.   Complicated by type 2 diabetes with peripheral neuropathy      Plan:   1.   Questions were answered.  2.   Continue velcro compression.  3.   Continue lac hydrin for hyperkeratosis. Some hyperatosis removed today.   4.   Elevate legs as much as able.  He is doing very well and he should continue his present cares.  5.   Patient will follow up in 8 months or when needed.     Time spent with patient 15 minutes with greater than 50% time in consultation, education and coordination of care.   ---------------------------------------------------------------------------------------------------------------------     Chief Complaint: Bilateral leg swelling and toe ulcerations    History of Present Illness:   Karsten Mccain returns to the [facility] for follow up of bilateral leg swelling with toe ulcerations. He is here with his sister. This is complicated by severe secondary lymphedema stage III, elephantiasis, with fibrosis scarring with keratoderma and papillomatosis. He has right leg paresis status post old left CVA. He has type 2 diabetes with peripheral neuropathy and is supra-morbidly obese. He is regularly wearing his velcro compression which works well for him. There is no weeping. He continue to use the lac hydrin cream.  This helps the hyperkeratosis.    Swelling is under good control.  He and his family are pleased.   There has been no new numbness, tingling, weakness, masses, rashes, shortness of breath or chest pain. There  has been no new fevers or pain.  Pain is rated a 0 on a 10 point scale.  His health has been stable for the last 6 months.  He has had no hospitalizations.       Past Medical History:   Diagnosis Date     Abscess or cellulitis of leg 2/27/2015     Acquired lymphedema 1/11/2011     Activated protein C resistance 1/16/2011     Advance directive discussed with patient 12/20/2010    Overview:  Patient has identified Health Care Agent(s): Yes Add Health Care Agents: Yes   Health Care Agent(s): Primary Health Care Agent: Miguel Mccain Relationship: Father Phone: [phone]  Secondary Health Care Agent: Ana Lilia Mccain Relationship: Mother Phone: [phone]     Patient has Advanced Care Plan Documents (Health Care Directive, POLST): Yes   Advance Care Plan Documents: Health     Airway hyperreactivity 2/27/2013     Amnesia 9/2/2010     Ankylosing polyarthritis 6/1/2006    Overview:  On methotrexate and remicade  Overview:  diagnosed 1982 sees Dr. Reis      Apnea, sleep 6/13/2006    Overview:  Uses nasal BIPAP inactive icd-9 diagnosis auto replaced with icd-10, display name retained//mporz      Arthralgia of hip 3/5/2013     Avitaminosis D 3/8/2012    Overview:  inactive icd-9 diagnosis auto replaced with icd-10, display name retained//mporz      Benign prostatic hyperplasia with urinary obstruction 2/4/2008     Cannot sleep 11/6/2008    Overview:  inactive icd-9 diagnosis auto replaced with icd-10, display name retained//mporz      Cardiac failure right 3/30/2011     Cerebral infarct 1/11/2011    Overview:  Basal ganglion       Clotting disorder      Combined fat and carbohydrate induced hyperlipemia 11/25/2010     Diabetes 1/11/2011    Overview:  HEMOGLOBIN A1C (%)  Date Value  11/27/2010 9.1*  7/1/2004 6.2        Diabetic neuropathy 3/1/2011     Disturbance of skin sensation 11/2/2015     DU (duodenal ulcer) 3/29/2015     Encounter for therapeutic drug monitoring 11/5/2013     Essential (primary) hypertension  11/2/2015    Overview:  inactive icd-9 diagnosis auto replaced with icd-10, display name retained//wilbertz      Extreme obesity 11/2/2015     Foot ulcer 3/5/2013     Hypertensive pulmonary vascular disease 1/11/2012    fused rib cage causing restriction and pulm htn related RV failure.     Stroke     right leg and arm weakness around 2010/2011 he thinks., has poor memory and his initial aphasia as well that has improved.       Past Surgical History:   Procedure Laterality Date     CHOLECYSTECTOMY       colostomy         Current Outpatient Prescriptions   Medication Sig Dispense Refill     acetaminophen (TYLENOL) 500 MG tablet Take 1,000 mg by mouth every 8 (eight) hours.        acetic acid (VOSOL) 2 % otic solution Administer into ears.       ammonium lactate (LAC-HYDRIN) 12 % lotion        amoxicillin (AMOXIL) 500 MG capsule Take 500 mg by mouth as needed. Prior to dental work       atorvastatin (LIPITOR) 40 MG tablet Take 40 mg by mouth daily. Indications: Hypercholesterolemia       baclofen (LIORESAL) 10 MG tablet TK 1 T PO TID  5     bumetanide (BUMEX) 1 MG tablet Take 2 mg by mouth daily.       canagliflozin (INVOKANA) 300 mg Tab Take 300 mg by mouth daily. Indications: Type 2 Diabetes Mellitus       canagliflozin-metformin 150-1,000 mg Tab Take 1 tablet by mouth.       clindamycin (CLINDAGEL) 1 % gel Apply topically.       clotrimazole (LOTRIMIN) 1 % cream Apply topically.       diphenhydrAMINE (BENADRYL) 12.5 mg/5 mL liquid Take 12.5 mg by mouth.       DULoxetine (CYMBALTA) 60 MG capsule Take 60 mg by mouth.       ferrous gluconate 324 mg (37.5 mg iron) Tab Take 1 tablet by mouth daily.       finasteride (PROSCAR) 5 mg tablet Take 5 mg by mouth daily.       folic acid (FOLVITE) 1 MG tablet Take 1 mg by mouth daily.       gabapentin (NEURONTIN) 400 MG capsule Take 800 mg by mouth 3 (three) times a day. Indications: Neuropathic Pain       guaiFENesin (HUMIBID 3) 400 mg Tab Take 400 mg by mouth every 4 (four)  "hours as needed.       hydrocortisone 1 % cream Apply 1 application topically as needed.       inFLIXimab (REMICADE) 100 mg injection Infuse 600 mg into a venous catheter every 2 (two) months.       insulin regular hum U-500 conc, HumuLIN R, (HUMULIN R CONC) 500 unit/mL CONCENTRATED injection USING A U100 SYRINGE DRAW UP 50 UNITS FOR SUBCUTANEOUS INJECTION BEFORE BREAKFAST, 60 UNITS BEFORE DINNER AND 20 UNITS AT MIDNIGHT  Indications: Diabetes Mellitus with Severe Insulin Resistance       insulin syringe-needle U-100 1/2 mL 31 x 5/16\" Syrg        lactic acid-urea Lotn Apply to crusting, thickened areas of skin twice a day until clear. 60 mL 3     lidocaine (LIDODERM) 5 % Place 1 patch on the skin daily.       liraglutide (VICTOZA 2-KAYLYNN) 0.6 mg/0.1 mL (18 mg/3 mL) PnIj injection Inject 1.8 mg under the skin daily.       loratadine (CLARITIN) 10 mg tablet Take 10 mg by mouth daily.       magnesium oxide 250 mg Tab Take 500 mg by mouth 3 (three) times a week.        methotrexate 2.5 MG tablet Take 10 mg by mouth once a week.       metoprolol succinate (TOPROL-XL) 25 MG Take 25 mg by mouth 2 (two) times a day.       NASONEX 50 mcg/actuation nasal spray SHAKE LQ AND U 2 SPRAYS IEN BID  4     nitroglycerin (NITROSTAT) 0.4 MG SL tablet Place 1 tablet under the tongue as needed.       nystatin (MYCOSTATIN) powder Apply topically as needed.       omeprazole (PRILOSEC) 20 MG capsule Take 20 mg by mouth.       oxyCODONE (ROXICODONE) 5 MG immediate release tablet Take 5 mg by mouth.       pantoprazole (PROTONIX) 40 MG tablet Take 40 mg by mouth 2 (two) times a day. Indications: Gastric Ulcer       potassium chloride (KLOR-CON) 10 MEQ CR tablet Take 40 mEq by mouth daily. 3 tablets at lunch, 3 tablets at dinner.       prochlorperazine (COMPAZINE) 10 MG tablet Take 10 mg by mouth every 8 (eight) hours as needed.       spironolactone (ALDACTONE) 50 MG tablet Take 100 mg by mouth daily.       tamsulosin (FLOMAX) 0.4 mg Cp24 Take " 0.4 mg by mouth daily.       terbinafine HCl (LAMISIL AT) 1 % cream Apply 1 application topically. To legs PRN       urea-alpha hydroxy acids (ATRAC-TAIN) 10-4 % Crea Apply topically.       warfarin (COUMADIN) 1 MG tablet Take 3 tablets by mouth daily. Take  Sun. 3 mg, Mon. 3 mg, Tues. 4 mg, Wed. 3 mg, Thurs. 3 mg, Fri. 4 mg, Sat.3 mg       Current Facility-Administered Medications   Medication Dose Route Frequency Provider Last Rate Last Dose     lidocaine 2 % jelly (XYLOCAINE)   Topical PRN Laura Levy MD   1 application at 10/24/16 1235       Allergies   Allergen Reactions     Aspirin Other (See Comments)     Not allergic. Patient is on coumadin  Patient on coumadin       Clindamycin Other (See Comments)     Significant heartburn when taking 300 mg four times per day .     Levaquin [Levofloxacin] Other (See Comments)     Muscle burn per pt.     Lovastatin Myalgia     LW Reaction: myalgias     Other Allergy (See Comments) Other (See Comments)     PN: LW FI1: nka  PN: LW CM1: Contrast Adverse Reaction - None Reaction :  PN: LW CM1: Contrast Adverse Reaction - None Reaction :       Social History     Social History     Marital status: Single     Spouse name: N/A     Number of children: N/A     Years of education: N/A     Occupational History     Not on file.     Social History Main Topics     Smoking status: Never Smoker     Smokeless tobacco: Never Used     Alcohol use No      Comment: rare socially, not in years.     Drug use: No     Sexual activity: No     Other Topics Concern     Not on file     Social History Narrative    Single.  No kids.  Retired .       Family History   Problem Relation Age of Onset     Arthritis Mother      Heart disease Father      Diabetes Sister      Cancer Sister      Uterine cancer Sister      Diabetes Sister      No Medical Problems Sister      Stroke Brother      Review of Systems:  Karsten Mccain no new numbess, tingling or weakness, redness or  rashes, fevers, new masses, abdominal bloating or discomfort, unexplained weight loss, increased pain, new ulcers, shortness of breath and chest pain  Full 12 point review of systems was completed.    Physical Exam:  There were no vitals filed for this visit.  BMI 57.29  Circumferential measures:    Vasc Edema 9/7/2016 10/24/2016 12/28/2016 3/27/2017 9/28/2017   Right just above MTP 37 36 32.5 35 24.7   Right Ankle 41.7 38.5 38.8 35.5 38.2   Right Widest Calf 54.5 52 53.0 55 53.3   Right Thigh Up 10cm 74.3 75.3 74.0 71.3 74.5   Left - just above MTP 36.8 35.4 33.0 35.7 35.5   Left Ankle 41.7 38.4 37.4 35.6 38.2   Left Widest Calf 54.9 52 48.2 53.2 52.3   Left Thigh Up 10cm 73.4 74.4 71.4 71.8 72.9     Measures are looking very good.    General:  62 y.o. male in no apparent distress.  Alert and oriented x 3.  Cooperative. Affect normal.    Vascular: There are no significant telangietasias, medial ankle venous flares, venous varicosities  and spider veins . There is normal capillary refill.     Neurological:  Decreased to PP and LT in the feet bilaterally.  Strength normal in hip flexion, knee flexion/extentsion, ankle dorsiflexion and great toe extension bilaterally.      Integumentary: Skin of the legs is uniformly warm and dry, erythematous, with papillomatosis, with hyperkeratosis and keratoderma and with positive Stemmer's Sign in the bilateral toes.  The hyperkeratosis continues to decrease.  There is no weeping in either leg or foot.  Toenails are hypertrophied.  Skin of the legs and feet continue to be improving from his baseline.  No open ulcerations are noted.    Laura Levy MD, ABWMS, FACCWS, [facility]  Medical Director Wound Care and Lymphedema  [facility]  [phone]

## 2021-06-17 NOTE — PATIENT INSTRUCTIONS - HE
Patient Instructions by Almaz Dawn RN at 12/5/2019  1:40 PM     Author: Almaz Dawn RN Service: -- Author Type: Registered Nurse    Filed: 12/5/2019  2:47 PM Encounter Date: 12/5/2019 Status: Addendum    : Almaz Dawn RN (Registered Nurse)    Related Notes: Original Note by Almaz Dawn RN (Registered Nurse) filed at 12/5/2019  2:41 PM       We are ordering your velcro compression stockings through [employer].  A representative from [employer] will contact you in 1-2 days to confirm pricing and delivery.  If you miss their call or have other questions, please call [employer] Customer service at [phone]  Brand: Compreflex Lite Transition  Size: XL, Tall    Swelling in the legs can be caused by many reasons.  Treatment usually includes some type of compression. We are prescribing some velcro compression garments. Your compression should be put on first thing in the morning and taken off at night. It is  important to wear them with long periods of sitting/standing, long car rides or if you will be flying. Going without compression for even brief periods of time can be damaging to your legs and your health.  Compression Velcro may need to be replaced every 9-12 months.  Often the liners and socks need to be replaced every three or four months.  The neoprene velcro may last up to 2 years.  If the velcro becomes worn a tailor may be able to repair it for you inexpensively.  They do not need to be worn at night while in bed.     If we have seen you within the year we can refill your compression.  Otherwise, your primary care provider can refill them. Call us with any problems or questions.       Please call us if you have any questions [phone]    Thank you for choosing [employer].          Velcro Compression Wraps Instructions    1) Slide leg liner or Tubigrip onto the leg before applying the velcro warp.     2) Apply the velcro wrap over the leg liner. Pull bands to tighten for compression.      3) Start at the tightening from bottom of the velcro wrap.  It should start just above the ankle bone and the top should reach to just below the knee crease.    4) Angle each band individually to achieve a snug and wrinkle-free fit. Do not tuck the bands and the velcro should never touch the skin.    5) Lastly apply the anklet sock over the velcro wrap if instructed to do so. This should be worn over the velcro wrap due to sensitivity from the ribbed edge.    6) To remove the velcro wrap, undo each band and fold it back on itself. If done properly the garment should be ready for easy application.    7) To extend the life of velcro wraps, hand wash and hang dry.         DO NOT STRAP VELCRO TO LEG LINER OR TUBRIGRIP!!!  1.2.        3.

## 2021-06-17 NOTE — PATIENT INSTRUCTIONS - HE
Patient Instructions by Almaz Dawn, RN at 6/3/2019  2:00 PM     Author: Almaz Dawn RN Service: -- Author Type: Registered Nurse    Filed: 6/3/2019  3:55 PM Encounter Date: 6/3/2019 Status: Addendum    : Almaz Dawn RN (Registered Nurse)    Related Notes: Original Note by Almaz Dawn RN (Registered Nurse) filed at 6/3/2019  3:54 PM       Continue lac hytrin to hyperkeratotic areas.    We are ordering your velcro compression stockings through [employer].  A representative from [employer] will contact you in 1-2 days to confirm pricing and delivery.  If you miss their call or have other questions, please call [employer] Customer service at [phone]    Swelling in the legs can be caused by many reasons.  Treatment usually includes some type of compression. We are prescribing some velcro compression garments. Your compression should be put on first thing in the morning and taken off at night. It is  important to wear them with long periods of sitting/standing, long car rides or if you will be flying. Going without compression for even brief periods of time can be damaging to your legs and your health.  Compression Velcro may need to be replaced every 9-12 months.  Often the liners and socks need to be replaced every three or four months.  The neoprene velcro may last up to 2 years.  If the velcro becomes worn a tailor may be able to repair it for you inexpensively.  They do not need to be worn at night while in bed.     If we have seen you within the year we can refill your compression.  Otherwise, your primary care provider can refill them. Call us with any problems or questions.     To order we recommend:    Compreflex Lite Transition: Size:   Extra Large, Tall              Please call us if you have any questions [phone]    Thank you for choosing [employer].                                    Velcro Compression Wraps Instructions    1) Slide leg liner or Tubigrip onto the leg before  applying the velcro warp.     2) Apply the velcro wrap over the leg liner. Pull bands to tighten for compression.     3) Start at the tightening from bottom of the velcro wrap.  It should start just above the ankle bone and the top should reach to just below the knee crease.    4) Angle each band individually to achieve a snug and wrinkle-free fit. Do not tuck the bands and the velcro should never touch the skin.    5) Lastly apply the anklet sock over the velcro wrap if instructed to do so. This should be worn over the velcro wrap due to sensitivity from the ribbed edge.    6) To remove the velcro wrap, undo each band and fold it back on itself. If done properly the garment should be ready for easy application.    7) To extend the life of velcro wraps, hand wash and hang dry.         DO NOT STRAP VELCRO TO LEG LINER OR TUBRIGRIP!!!  1.2.        3.4.d

## 2021-06-19 NOTE — LETTER
Letter by Laura Levy MD at      Author: Laura Levy MD Service: -- Author Type: --    Filed:  Encounter Date: 2019 Status: Signed       2019    [facility]  Fax: [phone] Wound Dressing Rx and Order Form  Customer Service: [phone] Order Status: New Order   Verbal: Almza Dawn RN            Patient Info:  Name: Karsten Mccain  : 1955  Address:   [address] [place] [address]  Phone: [phone]      Insurance Info:  Primary: Payor: MEDICARE / Plan: MEDICARE A AND B / Product Type: Medicare /    Secondary: [place]  [id number] - (Medicare)      Physician Info:   Name:  Laura Levy MD   Dept Address/Phones:   [address] [place] [address], [address]  [facility] [address] [phone]  Fax: [phone]    Lymphedema circumferential measurements (in cm):  No data recorded  No data recorded  No data recorded  No data recorded  No data recorded  No data recorded  No data recorded  No data recorded  Right just above MTP: 33    Right Ankle: 35.5    Right Widest Calf: 51.3    No data recorded  Left - just above MTP: 33.6    Left Ankle: 33.5    Left Widest Calf: 51.3    No data recorded    Wound info:  Encounter Diagnoses   Name Primary?   ? Acquired lymphedema Yes   ? Elephantiasis    ? Hyperkeratosis of skin    ? Venous insufficiency of both lower extremities    ? Venous hypertension of lower extremity, bilateral    ? Scar condition and fibrosis of skin    ? Diabetic peripheral neuropathy associated with type 2 diabetes mellitus (H)    ? Morbid obesity with BMI of 50.0-59.9, adult (H)           Duration of Need: 90    Qualifying wound/Debridement No - no wound, patient will pay out of pocket     Dressing Type Brand Size Number of pieces Frequency of change   Primary Velcro Compression Compreflex Lite Transition XL, Tall.  Color Black 1 pair NA                                            Secondary                                        Tape                          Note: If total out of pocket is more than $50.00 please contact the patient before processing order.     OK to forward to covered supplier.     Electronically Signed Physician: Laura Levy MD Date: 12/5/2019

## 2021-06-19 NOTE — LETTER
Letter by Laura Levy MD at      Author: Laura Levy MD Service: -- Author Type: --    Filed:  Encounter Date: 6/3/2019 Status: (Other)       6/3/2019    [facility]  Fax: [phone] Wound Dressing Rx and Order Form  Customer Service: [phone] Order Status: New Order   Verbal: Almaz Dawn RN            Patient Info:  Name: Karsten Mccain  : 1955  Address:   [address] [facility] [address]  Phone: [phone] - sister Gloria Mccain may answer and she can handle they order      Insurance Info:  Primary: Payor: MEDICARE / Plan: MEDICARE A AND B / Product Type: Medicare /    Secondary: [place]  [id number] - (Medicare)      Physician Info:   Name:  Laura Levy MD   Dept Address/Phones:   [address] [place] [address], [address]  [facility] [address] [phone]  Fax: [phone]        Wound info:  Encounter Diagnoses   Name Primary?   ? Acquired lymphedema Yes   ? Elephantiasis    ? Hyperkeratosis of skin    ? Venous insufficiency of both lower extremities    ? Venous hypertension of lower extremity, bilateral    ? Scar condition and fibrosis of skin    ? Swelling of limb    ? Morbid obesity with BMI of 50.0-59.9, adult (H)    ? Diabetic peripheral neuropathy associated with type 2 diabetes mellitus (H)              Dressing Type Brand Size Number of pieces Frequency of change   Primary Velcro Compression  Compreflex Lite Transition Extra Large, Tall 1 pair NA                                           Secondary                                        Tape                          Note: If total out of pocket is more than $50.00 please contact the patient before processing order.     OK to forward to covered supplier.     Electronically Signed Physician: Laura Levy MD Date: 6/3/2019

## 2021-06-22 NOTE — PROGRESS NOTES
Date of Service: 11/29/18    Date last seen: 03/29/18    PCP: Irving Hodgson MD    Impression:   1.   Bilateral leg swelling -doing much better  2.   Secondary lymphedema-Stage 3 severe -elephantiasis  3.   Venous hypertension bilateral legs  4.   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   Hypertrophy of toenails  6.   Long term anticoagulation  7.   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   Complicated by morbid obesity   9.   Complicated by type 2 diabetes with peripheral neuropathy    Plan:   1.   Questions were answered.  He is here with his cousin.  2.   Continue velcro compression.  3.   Continue lac hydrin for hyperkeratosis. After discussion of risk factors and consent obtained under clean conditions, with his underlying diabetes and anticoagulation the hyperkeratosis was debrided as it has softened.  The curette was used.  A total of 30 cm  was debrided .  This took off significant pressure from the skin, decreasing his risk of further damage.  This type of debridement is considered through the epidermis area.  The feet and ankles were much improved after this..  4.   Elevate legs as much as able.  He is doing very well and he should continue his present cares.  I recommended he start moving more and exercising as much as possible.  5.   Patient will follow up in 6 months or when needed.     Time spent with patient 15 minutes with greater than 50% time in consultation, education and coordination of care.   ---------------------------------------------------------------------------------------------------------------------     Chief Complaint: Bilateral leg swelling and toe ulcerations    History of Present Illness:   Karsten Mccain returns to the [facility] for follow up of bilateral leg swelling  with toe ulcerations. He is here with his sister. This is complicated by severe secondary lymphedema stage III, elephantiasis, with fibrosis scarring with keratoderma and papillomatosis. He has right leg paresis status post old left CVA. He has type 2 diabetes with peripheral neuropathy and is supra-morbidly obese. He is regularly wearing his velcro compression which works well for him. There is no weeping. He continues to use the lac hydrin cream.  This helps decrease the hyperkeratosis.  He sustained a Right hemorrhagic CVA this summer and being left hand dominant he has slight residual right hand weakness and word finding difficulties.   He has improved and is no longer in therapy.   Swelling is under good control.  He and his family are pleased.   There has been no new numbness, tingling, weakness, masses, rashes, shortness of breath or chest pain. There has been no new fevers or pain.  Pain is rated a 0 on a 10 point scale.  His health has been stable for the last 6 months.  He has had no hospitalizations.       Past Medical History:   Diagnosis Date     Abscess or cellulitis of leg 2/27/2015     Acquired lymphedema 1/11/2011     Activated protein C resistance (H) 1/16/2011     Advance directive discussed with patient 12/20/2010    Overview:  Patient has identified Health Care Agent(s): Yes Add Health Care Agents: Yes   Health Care Agent(s): Primary Health Care Agent: Miguel Kalyn Relationship: Father Phone: [phone]  Secondary Health Care Agent: Ana Lilia Mccain Relationship: Mother Phone: [phone]     Patient has Advanced Care Plan Documents (Health Care Directive, POLST): Yes   Advance Care Plan Documents: Health     Airway hyperreactivity 2/27/2013     Amnesia 9/2/2010     Ankylosing polyarthritis (H) 6/1/2006    Overview:  On methotrexate and remicade  Overview:  diagnosed 1982 sees Dr. Reis      Apnea, sleep 6/13/2006    Overview:  Uses nasal BIPAP inactive icd-9 diagnosis auto replaced with  icd-10, display name retained//mporz      Arthralgia of hip 3/5/2013     Avitaminosis D 3/8/2012    Overview:  inactive icd-9 diagnosis auto replaced with icd-10, display name retained//mporz      Benign prostatic hyperplasia with urinary obstruction 2/4/2008     Cannot sleep 11/6/2008    Overview:  inactive icd-9 diagnosis auto replaced with icd-10, display name retained//mporz      Cardiac failure right (H) 3/30/2011     Cerebral infarct (H) 1/11/2011    Overview:  Basal ganglion       Clotting disorder (H)      Combined fat and carbohydrate induced hyperlipemia 11/25/2010     Diabetes (H) 1/11/2011    Overview:  HEMOGLOBIN A1C (%)  Date Value  11/27/2010 9.1*  7/1/2004 6.2        Diabetic neuropathy (H) 3/1/2011     Disturbance of skin sensation 11/2/2015     DU (duodenal ulcer) 3/29/2015     Encounter for therapeutic drug monitoring 11/5/2013     Essential (primary) hypertension 11/2/2015    Overview:  inactive icd-9 diagnosis auto replaced with icd-10, display name retained//mporz      Extreme obesity 11/2/2015     Foot ulcer (H) 3/5/2013     Hypertensive pulmonary vascular disease (H) 1/11/2012    fused rib cage causing restriction and pulm htn related RV failure.     Stroke (H)     right leg and arm weakness around 2010/2011 he thinks., has poor memory and his initial aphasia as well that has improved.       Past Surgical History:   Procedure Laterality Date     CHOLECYSTECTOMY       colostomy         Current Outpatient Medications   Medication Sig Dispense Refill     acetaminophen (TYLENOL) 500 MG tablet Take 1,000 mg by mouth every 8 (eight) hours.        acetic acid (VOSOL) 2 % otic solution Administer into ears.       ammonium lactate (LAC-HYDRIN) 12 % lotion        amoxicillin (AMOXIL) 500 MG capsule Take 500 mg by mouth as needed. Prior to dental work       atorvastatin (LIPITOR) 40 MG tablet Take 40 mg by mouth daily. Indications: Hypercholesterolemia       bumetanide (BUMEX) 1 MG tablet Take 2 mg by  "mouth daily.       canagliflozin-metformin 150-1,000 mg Tab Take 1 tablet by mouth.       diphenhydrAMINE (BENADRYL) 12.5 mg/5 mL liquid Take 12.5 mg by mouth.       DULoxetine (CYMBALTA) 60 MG capsule Take 60 mg by mouth.       finasteride (PROSCAR) 5 mg tablet Take 5 mg by mouth daily.       folic acid (FOLVITE) 1 MG tablet Take 1 mg by mouth daily.       gabapentin (NEURONTIN) 400 MG capsule Take 800 mg by mouth 3 (three) times a day. Indications: Neuropathic Pain       guaiFENesin (HUMIBID 3) 400 mg Tab Take 400 mg by mouth every 4 (four) hours as needed.       inFLIXimab (REMICADE) 100 mg injection Infuse 600 mg into a venous catheter every 2 (two) months.       insulin regular hum U-500 conc, HumuLIN R, (HUMULIN R CONC) 500 unit/mL CONCENTRATED injection USING A U100 SYRINGE DRAW UP 50 UNITS FOR SUBCUTANEOUS INJECTION BEFORE BREAKFAST, 60 UNITS BEFORE DINNER AND 20 UNITS AT MIDNIGHT  Indications: Diabetes Mellitus with Severe Insulin Resistance       insulin syringe-needle U-100 1/2 mL 31 x 5/16\" Syrg        lactic acid-urea Lotn Apply to crusting, thickened areas of skin twice a day until clear. 60 mL 3     loratadine (CLARITIN) 10 mg tablet Take 10 mg by mouth daily.       methocarbamol (ROBAXIN) 500 MG tablet 1 tab three times a day as needed       methotrexate 2.5 MG tablet Take 10 mg by mouth once a week.       metoprolol succinate (TOPROL-XL) 25 MG Take 25 mg by mouth 2 (two) times a day.       miscellaneous medical supply Misc Bipap, heated humidifer, mask, headgear, filters and tubing. Pressure 19/13.  For home use. Length of need: 99       NASONEX 50 mcg/actuation nasal spray SHAKE LQ AND U 2 SPRAYS IEN BID  4     nitroglycerin (NITROSTAT) 0.4 MG SL tablet Place 1 tablet under the tongue as needed.       nystatin (MYCOSTATIN) powder Apply topically as needed.       oxyCODONE (ROXICODONE) 5 MG immediate release tablet Take 5 mg by mouth.       pantoprazole (PROTONIX) 40 MG tablet Take 40 mg by mouth 2 " (two) times a day. Indications: Gastric Ulcer       potassium chloride (KLOR-CON) 10 MEQ CR tablet Take 40 mEq by mouth daily. 3 tablets at lunch, 3 tablets at dinner.       prochlorperazine (COMPAZINE) 10 MG tablet Take 10 mg by mouth every 8 (eight) hours as needed.       spironolactone (ALDACTONE) 50 MG tablet Take 100 mg by mouth daily.       tamsulosin (FLOMAX) 0.4 mg Cp24 Take 0.4 mg by mouth daily.       terbinafine HCl (LAMISIL AT) 1 % cream Apply 1 application topically. To legs PRN       warfarin (COUMADIN) 3 MG tablet Take 4 mg by mouth daily.       baclofen (LIORESAL) 10 MG tablet TK 1 T PO TID  5     canagliflozin (INVOKANA) 300 mg Tab Take 300 mg by mouth daily. Indications: Type 2 Diabetes Mellitus       clindamycin (CLINDAGEL) 1 % gel Apply topically.       clotrimazole (LOTRIMIN) 1 % cream Apply topically.       ferrous gluconate 324 mg (37.5 mg iron) Tab Take 1 tablet by mouth daily.       hydrocortisone 1 % cream Apply 1 application topically as needed.       lidocaine (LIDODERM) 5 % Place 1 patch on the skin daily.       liraglutide (VICTOZA 2-KAYLYNN) 0.6 mg/0.1 mL (18 mg/3 mL) PnIj injection Inject 1.8 mg under the skin daily.       magnesium oxide 250 mg Tab Take 500 mg by mouth 3 (three) times a week.        omeprazole (PRILOSEC) 20 MG capsule Take 20 mg by mouth.       urea-alpha hydroxy acids (ATRAC-TAIN) 10-4 % Crea Apply topically.       Current Facility-Administered Medications   Medication Dose Route Frequency Provider Last Rate Last Dose     lidocaine 2 % jelly (XYLOCAINE)   Topical PRN Laura Levy MD   1 application at 10/24/16 1235       Allergies   Allergen Reactions     Aspirin Other (See Comments)     Not allergic. Patient is on coumadin  Patient on coumadin       Clindamycin Other (See Comments)     Significant heartburn when taking 300 mg four times per day .     Levaquin [Levofloxacin] Other (See Comments)     Muscle burn per pt.     Lovastatin Myalgia     LW Reaction:  myalgias     Other Allergy (See Comments) Other (See Comments)     PN: LW FI1: nka  PN: LW CM1: Contrast Adverse Reaction - None Reaction :  PN: LW CM1: Contrast Adverse Reaction - None Reaction :       Social History     Socioeconomic History     Marital status: Single     Spouse name: Not on file     Number of children: Not on file     Years of education: Not on file     Highest education level: Not on file   Social Needs     Financial resource strain: Not on file     Food insecurity - worry: Not on file     Food insecurity - inability: Not on file     Transportation needs - medical: Not on file     Transportation needs - non-medical: Not on file   Occupational History     Not on file   Tobacco Use     Smoking status: Never Smoker     Smokeless tobacco: Never Used   Substance and Sexual Activity     Alcohol use: No     Comment: rare socially, not in years.     Drug use: No     Sexual activity: No   Other Topics Concern     Not on file   Social History Narrative    Single.  No kids.  Retired .       Family History   Problem Relation Age of Onset     Arthritis Mother      Heart disease Father      Diabetes Sister      Cancer Sister      Uterine cancer Sister      Diabetes Sister      No Medical Problems Sister      Stroke Brother      Review of Systems:    Karsten Mccain no new numbess, tingling or weakness, redness or rashes, fevers, new masses, abdominal bloating or discomfort, unexplained weight loss, increased pain, new ulcers, shortness of breath and chest pain  Full 12 point review of systems was completed.    Labs:    I personally reviewed the following labs today and those on care everywhere, if indicated    No results found for: SEDRATE      No results found for: CRP        Lab Results   Component Value Date    CREATININE 1.12 01/17/2017      No results found for: HGBA1C        Lab Results   Component Value Date    BUN 22 01/17/2017              No results found for: LABPROT, ALBUMIN     No results found for: [id number]    No results found for: TSH  No results found for: WBC, HGB, HCT, MCV, PLT     Imaging:      I personally reviewed the following imaging today and those on care everywhere, if indicated      Interface, Powers[employer] - 08/03/2018  7:40 AM CDT  Clinical History: F/U Intracranial Bleed    Technique: Noncontrast axial CT images are obtained of the brain. This CT Scan used dose modulation, iterative reconstruction, and/or weight based dosing when appropriate to reduce radiation dose to as low as reasonably achievable.    Comparison: 8/2/2018 head CT    Findings: Right frontal intraparenchymal hematoma measures 3.0 x 3.6 cm (series 2 image 22) compared to 2.7 x 3.6 cm on the study of one day prior. There is surrounding vasogenic edema. There is small amount of extra-axial hemorrhage overlying the right frontal lobe and extending into several subjacent sulci. There is local sulcal effacement. No significant mass effect. No midline shift. Ventricles normal size. Basal cisterns are widely patent.    Encephalomalacia in the left corona radiata and basal ganglia region with subjacent ex vacuo frontal horn dilatation.    No calvarial fracture. Paranasal sinuses and temporal bone structures are well aerated. There is atlantodental joint osteoarthrosis. Skull base structures are intact.    Impression:   1. Grossly stable right frontal intraparenchymal hemorrhage with surrounding vasogenic edema and small amount of extra-axial hemorrhage overlying the right frontal lobe and extending to several adjacent sulci. No significant mass effect.  2. Left cerebral hemispheric encephalomalacia.   3. The right frontal hematoma is an atypical location for hypertensive bleed. Hemorrhage related to underlying mass lesion, vascular malformation or cerebral amyloid angiopathy are considerations. Consider further evaluation with brain MRI/MRA.    [facility]    Physical Exam:  Vitals:     11/29/18 1413   BP: 138/72   Pulse: 68   Resp: 24   Temp: 98.6  F (37  C)     BMI 57.17  (stable)    Circumferential measures:    Vasc Edema 12/28/2016 3/27/2017 9/28/2017 3/29/2018 11/29/2018   Right just above MTP 32.5 35 24.7 34 34.5   Right Ankle 38.8 35.5 38.2 35 35.3   Right Widest Calf 53.0 55 53.3 51.5  53.8   Right Thigh Up 10cm 74.0 71.3 74.5   72.5   Left - just above MTP 33.0 35.7 35.5 35 35   Left Ankle 37.4 35.6 38.2 34.5 35.8   Left Widest Calf 48.2 53.2 52.3 52.5 54.4   Left Thigh Up 10cm 71.4 71.8 72.9 - 76.2     Measures are stable.    General:  62 y.o. male in no apparent distress.      Psych:  Alert and oriented x 3.  Cooperative. Affect normal.    Vascular: There are no significant telangietasias, medial ankle venous flares, venous varicosities  and spider veins . There is normal capillary refill.     Neurological:  Decreased to PP and LT in the feet bilaterally.  Strength normal in knee flexion/extentsion, ankle dorsiflexion and great toe extension bilaterally.      Integumentary: Skin of the legs is uniformly warm and dry, erythematous, with papillomatosis, with hyperkeratosis and keratoderma and with positive Stemmer's Sign in the bilateral toes.  The hyperkeratosis continues to decrease and is much more easily removed.  There is no weeping in either leg or foot.  Toenails are hypertrophied.  Skin of the legs and feet continue to be improving from his baseline.  No open ulcerations are noted.  Skin is softer.  There is no pain to palpation.    Laura Levy MD, ABWMS, FACCWS, [facility]  Medical Director Wound Care and Lymphedema  [facility]  [phone]

## 2021-06-30 NOTE — PROGRESS NOTES
Progress Notes by Laura Levy MD at 2/22/2021  1:30 PM     Author: Laura Levy MD Service: -- Author Type: Physician    Filed: 2/22/2021  4:31 PM Encounter Date: 2/22/2021 Status: Signed    : Laura Levy MD (Physician)                     Date of Service: 02/22/21    Date last seen: 12/05/19    PCP: Irving Hodgson MD    Impression:   1. Bilateral leg swelling - slightly up  2. Secondary lymphedema-Stage 3 severe - elephantiasis  3. Venous hypertension bilateral legs  4. 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 Long term anticoagulation  6. S/p old left CVA, recent hemorrhagic R CVA with slight right arm weakness and word finding difficulties (note: left hand dominant)  7. Morbid obesity   8. Type 2 diabetes with peripheral neuropathy    Plan:   1. Questions were answered.  He is here with his cousin.  2. Continue velcro compression. Discussion with review of importance of compression being work daily.  3. Continue lac hydrin for hyperkeratosis.   4. At this time he will continue his program.  When I see him in follow, hopefully, the pandemic will be better controlled and I can reorder therapy with consideration of Flexitouch trial. He would like to hold off on therapy until this happens.  5. Patient will follow up in 5 months or when needed. They will call for any concerns or questions.    On day of encounter time spent in chart review and with patient in consultation, exam, education and coordination of care:  44 minutes  ---------------------------------------------------------------------------------------------------------------------     Chief Complaint: Bilateral leg swelling and history of toe ulcerations    History of Present Illness:   Jaxon Marshik returns to the  [facility] for follow up of bilateral leg swelling with history of toe ulcerations, severe secondary lymphedema  stage III, elephantiasis, fibrosis and scarring with keratoderma and papillomatosis complicated by right leg paresis,  old left CVA, type 2 diabetes with peripheral neuropathy and morbid obesity.  At his last visit his swelling was doing well and he was to continue velcro compression, use of Lac hydrin with exercise as able.   Today at follow up he reports he sometimes forgets to wear the compression daily as he needs help getting it on.  He continues to use the lac hydrin cream. He is here with his cousin.  They report the swelling is under good control.   There is no weeping.   He has not had any new ulcerations or signs of infections.  There has been no new numbness, tingling, weakness, masses, rashes, shortness of breath or chest pain. There has been no new fevers or pain.  Pain is rated a 0 on a 10 point scale.        Past Medical History:   Diagnosis Date   ? Abscess or cellulitis of leg 2/27/2015   ? Acquired lymphedema 1/11/2011   ? Activated protein C resistance (H) 1/16/2011   ? Advance directive discussed with patient 12/20/2010    Overview:  Patient has identified Health Care Agent(s): Yes Add Health Care Agents: Yes   Health Care Agent(s): Primary Health Care Agent: Miguel Mccain Relationship: Father Phone: [phone]  Secondary Health Care Agent: Ana Lilia Mccain Relationship: Mother Phone: [phone]     Patient has Advanced Care Plan Documents (Health Care Directive, POLST): Yes   Advance Care Plan Documents: Health   ? Airway hyperreactivity 2/27/2013   ? Amnesia 9/2/2010   ? Ankylosing polyarthritis (H) 6/1/2006    Overview:  On methotrexate and remicade  Overview:  diagnosed 1982 sees Dr. Reis    ? Apnea, sleep 6/13/2006    Overview:  Uses nasal BIPAP inactive icd-9 diagnosis auto replaced with icd-10, display name retained//mporz    ? Arthralgia of hip 3/5/2013   ? Avitaminosis D 3/8/2012    Overview:  inactive icd-9 diagnosis auto replaced with icd-10, display name retained//mporz    ?  "Benign prostatic hyperplasia with urinary obstruction 2/4/2008   ? Cannot sleep 11/6/2008    Overview:  inactive icd-9 diagnosis auto replaced with icd-10, display name retained//mporz    ? Cardiac failure right (H) 3/30/2011   ? Cerebral infarct (H) 1/11/2011    Overview:  Basal ganglion     ? Clotting disorder (H)    ? Combined fat and carbohydrate induced hyperlipemia 11/25/2010   ? Diabetes (H) 1/11/2011    Overview:  HEMOGLOBIN A1C (%)  Date Value  11/27/2010 9.1*  7/1/2004 6.2      ? Diabetic neuropathy (H) 3/1/2011   ? Disturbance of skin sensation 11/2/2015   ? DU (duodenal ulcer) 3/29/2015   ? Encounter for therapeutic drug monitoring 11/5/2013   ? Essential (primary) hypertension 11/2/2015    Overview:  inactive icd-9 diagnosis auto replaced with icd-10, display name retained//mporz    ? Extreme obesity 11/2/2015   ? Foot ulcer (H) 3/5/2013   ? Hypertensive pulmonary vascular disease (H) 1/11/2012    fused rib cage causing restriction and pulm htn related RV failure.   ? Stroke (H)     right leg and arm weakness around 2010/2011 he thinks., has poor memory and his initial aphasia as well that has improved.       Past Surgical History:   Procedure Laterality Date   ? CHOLECYSTECTOMY     ? colostomy         Current Outpatient Medications   Medication Sig Dispense Refill   ? acetaminophen (TYLENOL) 500 MG tablet Take 1,000 mg by mouth every 8 (eight) hours.      ? acetic acid (VOSOL) 2 % otic solution Administer into ears.     ? ammonium lactate (LAC-HYDRIN) 12 % lotion      ? atorvastatin (LIPITOR) 40 MG tablet Take 40 mg by mouth daily. Indications: Hypercholesterolemia     ? BD ULTRA-FINE PAPO PEN NEEDLE 32 gauge x 5/32\" Ndle USE UTD WITH VICTOZA AND INSULIN PENS  5   ? blood glucose test (CONTOUR NEXT TEST STRIPS) strips by Other route.     ? bumetanide (BUMEX) 1 MG tablet Take 2 mg by mouth daily.     ? canagliflozin (INVOKANA) 300 mg Tab Take 300 mg by mouth daily. Indications: Type 2 Diabetes Mellitus  " "   ? canagliflozin-metformin 150-1,000 mg Tab Take 1 tablet by mouth.     ? cholecalciferol, vitamin D3, 5,000 unit capsule Take 1 capsule by mouth.     ? clotrimazole (LOTRIMIN) 1 % cream Apply topically.     ? CONTOUR NEXT TEST STRIPS strips TEST FID  3   ? cyclobenzaprine (FLEXERIL) 5 MG tablet Take 5-10 mg by mouth.     ? diphenhydrAMINE (BENADRYL) 12.5 mg/5 mL liquid Take 12.5 mg by mouth.     ? DULoxetine (CYMBALTA) 60 MG capsule Take 60 mg by mouth.     ? empagliflozin 25 mg Tab Take 1 tablet by mouth daily.     ? folic acid (FOLVITE) 1 MG tablet Take 1 mg by mouth daily.     ? gabapentin (NEURONTIN) 400 MG capsule Take 800 mg by mouth 3 (three) times a day. Indications: Neuropathic Pain     ? generic lancets (ACCU-CHEK MULTICLIX LANCET) Test five times daily as directed     ? glimepiride (AMARYL) 4 MG tablet TAKE 1 TABLET(4 MG) BY MOUTH TWICE DAILY     ? hydrocortisone 1 % cream Apply 1 application topically as needed.     ? inFLIXimab (REMICADE) 100 mg injection Infuse 600 mg into a venous catheter every 2 (two) months.     ? insulin regular hum U-500 conc, HumuLIN R, (HUMULIN R CONC) 500 unit/mL CONCENTRATED injection USING A U100 SYRINGE DRAW UP 50 UNITS FOR SUBCUTANEOUS INJECTION BEFORE BREAKFAST, 60 UNITS BEFORE DINNER AND 20 UNITS AT MIDNIGHT  Indications: Diabetes Mellitus with Severe Insulin Resistance     ? insulin syringe-needle U-100 1 mL 31 gauge x 5/16 Syrg TEST TID.  0   ? insulin syringe-needle U-100 1/2 mL 31 x 5/16\" Syrg      ? insulin U-500 syringe-needle (BD INSULIN SYRINGE U-500) 1/2 mL 31 gauge x 15/64\" Syrg Use to give insulin injections 2 times daily.     ? JARDIANCE 25 mg Tab Take 1 tablet by mouth daily.  0   ? lactic acid-urea Lotn Apply to crusting, thickened areas of skin twice a day until clear. 60 mL 3   ? levETIRAcetam (KEPPRA) 750 MG tablet Take 750 mg by mouth 2 (two) times a day.  2   ? lidocaine (LIDODERM) 5 % Place 1 patch on the skin daily.     ? lisinopril " "(PRINIVIL,ZESTRIL) 20 MG tablet Take 20 mg by mouth daily.  1   ? loperamide (IMODIUM) 2 mg capsule Take 2 mg by mouth every 4 (four) hours as needed.     ? loratadine (CLARITIN) 10 mg tablet Take 10 mg by mouth daily.     ? magnesium gluconate 30 mg (550 mg) Tab Take 1 tablet by mouth.     ? magnesium oxide 250 mg Tab Take 500 mg by mouth 3 (three) times a week.      ? metFORMIN (GLUCOPHAGE) 500 MG tablet TAKE 2 TABLETS BY MOUTH TWICE DAILY WITH BREAKFAST AND DINNER     ? methotrexate 2.5 MG tablet Take 10 mg by mouth once a week.     ? metoprolol succinate (TOPROL-XL) 25 MG Take 25 mg by mouth 2 (two) times a day.     ? miscellaneous medical supply Misc Bipap, heated humidifer, mask, headgear, filters and tubing. Pressure 19/13.  For home use. Length of need: 99     ? multivitamin-iron-folic acid  mg-mcg Tab Take 1 tablet by mouth daily.     ? NASONEX 50 mcg/actuation nasal spray SHAKE LQ AND U 2 SPRAYS IEN BID  4   ? nitroglycerin (NITROSTAT) 0.4 MG SL tablet Place 1 tablet under the tongue as needed.     ? nystatin (MYCOSTATIN) powder Apply topically as needed.     ? omeprazole (PRILOSEC) 20 MG capsule Take 20 mg by mouth.     ? potassium chloride (KLOR-CON) 10 MEQ CR tablet Take 40 mEq by mouth daily. 3 tablets at lunch, 3 tablets at dinner.     ? prochlorperazine (COMPAZINE) 10 MG tablet Take 10 mg by mouth every 8 (eight) hours as needed.     ? spironolactone (ALDACTONE) 50 MG tablet Take 100 mg by mouth daily.     ? SYNJARDY XR 12.5-1,000 mg TBph TK 1 T PO BID. REPLACING INVOKAMET  11   ? tamsulosin (FLOMAX) 0.4 mg Cp24 Take 0.4 mg by mouth daily.     ? warfarin (COUMADIN) 3 MG tablet Take 4 mg by mouth daily.     ? zinc gluconate 50 mg tablet Take 1 tablet by mouth.     ? amoxicillin (AMOXIL) 500 MG capsule Take 500 mg by mouth as needed. Prior to dental work     ? baclofen (LIORESAL) 10 MG tablet TK 1 T PO TID  5   ? BD INSULIN SYRINGE U-500 1/2 mL 31 gauge x 15/64\" Syrg USE BID  1   ? clindamycin " "(CLINDAGEL) 1 % gel Apply topically.     ? ferrous gluconate 324 mg (37.5 mg iron) Tab Take 1 tablet by mouth daily.     ? finasteride (PROSCAR) 5 mg tablet Take 5 mg by mouth daily.     ? guaiFENesin (HUMIBID 3) 400 mg Tab Take 400 mg by mouth every 4 (four) hours as needed.     ? liraglutide (VICTOZA 2-KAYLYNN) 0.6 mg/0.1 mL (18 mg/3 mL) PnIj injection Inject 1.8 mg under the skin daily.     ? oxyCODONE (ROXICODONE) 5 MG immediate release tablet Take 5 mg by mouth.     ? pantoprazole (PROTONIX) 40 MG tablet Take 40 mg by mouth 2 (two) times a day. Indications: Gastric Ulcer     ? pen needle, diabetic (BD ULTRA-FINE PAPO PEN NEEDLE) 32 gauge x 5/32\" Ndle Use as directed with Insulin Pen and Victoza     ? polymyxin B-trimethoprim (FOR POLYTRIM) 10,000 unit- 1 mg/mL Drop ophthalmic drops polymyxin B sulfate 10,000 unit-trimethoprim 1 mg/mL eye drops     ? semaglutide 0.25 mg or 0.5 mg(2 mg/1.5 mL) PnIj Inject 0.5 mg under the skin every 7 days.     ? terbinafine HCl (LAMISIL AT) 1 % cream Apply 1 application topically. To legs PRN     ? tobramycin-dexamethasone (TOBRADEX) ophthalmic solution Administer 1 drop to both eyes.     ? urea-alpha hydroxy acids (ATRAC-TAIN) 10-4 % Crea Apply topically.       Current Facility-Administered Medications   Medication Dose Route Frequency Provider Last Rate Last Admin   ? lidocaine 2 % jelly (XYLOCAINE)   Topical PRN Luara Levy MD   1 application at 10/24/16 1235       Allergies   Allergen Reactions   ? Semaglutide Diarrhea   ? Aspirin Other (See Comments)     Not allergic. Patient is on coumadin  Patient on coumadin     ? Clindamycin Other (See Comments)     Significant heartburn when taking 300 mg four times per day .   ? Levaquin [Levofloxacin] Other (See Comments)     Muscle burn per pt.   ? Lovastatin Myalgia     LW Reaction: myalgias   ? Other Allergy (See Comments) Other (See Comments)     PN: LW FI1: nka  PN: LW CM1: Contrast Adverse Reaction - None Reaction :  PN: " GERONIMO CM1: Contrast Adverse Reaction - None Reaction :       Social History     Socioeconomic History   ? Marital status: Single     Spouse name: Not on file   ? Number of children: Not on file   ? Years of education: Not on file   ? Highest education level: Not on file   Occupational History   ? Not on file   Social Needs   ? Financial resource strain: Not on file   ? Food insecurity     Worry: Not on file     Inability: Not on file   ? Transportation needs     Medical: Not on file     Non-medical: Not on file   Tobacco Use   ? Smoking status: Never Smoker   ? Smokeless tobacco: Never Used   Substance and Sexual Activity   ? Alcohol use: No     Comment: rare socially, not in years.   ? Drug use: No   ? Sexual activity: Never   Lifestyle   ? Physical activity     Days per week: Not on file     Minutes per session: Not on file   ? Stress: Not on file   Relationships   ? Social connections     Talks on phone: Not on file     Gets together: Not on file     Attends Denominational service: Not on file     Active member of club or organization: Not on file     Attends meetings of clubs or organizations: Not on file     Relationship status: Not on file   ? Intimate partner violence     Fear of current or ex partner: Not on file     Emotionally abused: Not on file     Physically abused: Not on file     Forced sexual activity: Not on file   Other Topics Concern   ? Not on file   Social History Narrative    Single.  No kids.  Retired .       Family History   Problem Relation Age of Onset   ? Arthritis Mother    ? Heart disease Father    ? Diabetes Sister    ? Cancer Sister    ? Uterine cancer Sister    ? Diabetes Sister    ? No Medical Problems Sister    ? Stroke Brother      Review of Systems:    See HPI    Labs:    I personally reviewed the following lab results today and those on care everywhere, if indicated    11/13/2019 creatinine 1.22 BUN 21 glucose 140 hemoglobin 16.3 platelets 171    4/3/2019 hemoglobin  A1c 10.1    No results found for: SEDRATE      No results found for: CRP        Lab Results   Component Value Date    CREATININE 1.12 01/17/2017      No results found for: HGBA1C        Lab Results   Component Value Date    BUN 22 01/17/2017              No results found for: LABPROT, ALBUMIN    No results found for: [id number]    No results found for: TSH  No results found for: WBC, HGB, HCT, MCV, PLT     12/30/20  HgbA1C 8.2   Cr 0.91  GFR > 60   CBC stable    1/26/21   Cr 0.99  Hgb 15.9   CBC with diff WNL  ALT 47  HgbA1C 8.5  BUN 19  Cr 1.02  trigly 290  Chol 128  HDL 36  TSH 1.207    Imaging:      I personally reviewed the following imaging results today and those on care everywhere, if indicated  Nothing new to review    Physical Exam:  Vitals:    02/22/21 1346   BP: 138/70   Pulse: 78   Resp: 20   Temp: 98.5  F (36.9  C)     BMI 58.94 weight 365  (+8) pounds    Circumferential measures:    Vasc Edema 3/29/2018 11/29/2018 6/3/2019 12/5/2019 2/22/2021   Right just above MTP 34 34.5 33.3 33 35.4   Right Ankle 35 35.3 35 35.5 39.9   Right Widest Calf 51.5  53.8 50.7 51.3 52.4   Right Thigh Up 10cm   72.5 70.9 - -   Left - just above MTP 35 35 34 33.6 34.1   Left Ankle 34.5 35.8 34.8 33.5 38.4   Left Widest Calf 52.5 54.4 47.8 51.3 50.4   Left Thigh Up 10cm - 76.2 70.8 - -     Side to side measures are stable.  Overall measures up with weight gain.    General:  65 y.o. male in no apparent distress.      Psych:  Alert and oriented x 3.  Cooperative. Affect normal.    HEENT: Atraumatic and normocephalic.    Vascular: There are no significant telangietasias, medial ankle venous flares, venous varicosities  and spider veins .     Neurological:  Decreased to PP and LT in the feet bilaterally.  Strength normal in hip flexion, knee flexion/extension, ankle dorsiflexion and great toe extension bilaterally.      Integumentary: Skin of the legs is uniformly warm and dry, erythematous, with papillomatosis, with  hyperkeratosis and keratoderma and with positive Stemmer's Sign in the bilateral toes.  The hyperkeratosis is still mainly around the ankles but greatly improved . There are no open ulcerations.  There is no weeping in either leg or foot.  Toenails are hypertrophied.  Skin of the legs and feet continue to improve from baseline and is fairly soft now.  There is no pain to palpation.    Laura Levy MD, Founding Diplomate ABWMS, FACCWS, FAAPMR  Medical Director Wound Care and Lymphedema  [facility]  [phone]

## 2021-07-12 ENCOUNTER — OFFICE VISIT (OUTPATIENT)
Dept: VASCULAR SURGERY | Facility: CLINIC | Age: 66
End: 2021-07-12
Attending: PHYSICAL MEDICINE & REHABILITATION
Payer: MEDICARE

## 2021-07-12 VITALS
RESPIRATION RATE: 20 BRPM | SYSTOLIC BLOOD PRESSURE: 110 MMHG | HEART RATE: 76 BPM | TEMPERATURE: 98.9 F | DIASTOLIC BLOOD PRESSURE: 60 MMHG

## 2021-07-12 DIAGNOSIS — E11.42 DIABETIC PERIPHERAL NEUROPATHY ASSOCIATED WITH TYPE 2 DIABETES MELLITUS (H): ICD-10-CM

## 2021-07-12 DIAGNOSIS — I89.0 ELEPHANTIASIS: ICD-10-CM

## 2021-07-12 DIAGNOSIS — M79.89 SWELLING OF LIMB: Primary | ICD-10-CM

## 2021-07-12 DIAGNOSIS — L85.9 HYPERKERATOSIS OF SKIN: ICD-10-CM

## 2021-07-12 DIAGNOSIS — E66.01 MORBID OBESITY WITH BMI OF 50.0-59.9, ADULT (H): ICD-10-CM

## 2021-07-12 DIAGNOSIS — I89.0 ACQUIRED LYMPHEDEMA: ICD-10-CM

## 2021-07-12 DIAGNOSIS — I87.303 VENOUS HYPERTENSION OF LOWER EXTREMITY, BILATERAL: ICD-10-CM

## 2021-07-12 PROCEDURE — 99213 OFFICE O/P EST LOW 20 MIN: CPT | Performed by: PHYSICAL MEDICINE & REHABILITATION

## 2021-07-12 PROCEDURE — G0463 HOSPITAL OUTPT CLINIC VISIT: HCPCS

## 2021-07-12 ASSESSMENT — PAIN SCALES - GENERAL: PAINLEVEL: MODERATE PAIN (5)

## 2021-07-12 NOTE — PROGRESS NOTES
Leg Swelling Follow Up     Date of Service: July 12, 2021     Date last seen by Dr. Levy:  February 22, 2021    PCP: Irving Hodgson     Impression:   1. Bilateral leg swelling - under good control  2. Secondary lymphedema-Stage 3 severe - elephantiasis  3. Venous hypertension bilateral legs  4. 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 Long term anticoagulation  6. S/p old left CVA, recent hemorrhagic R CVA with slight right arm weakness and word finding difficulties (note: left hand dominant)  7. Morbid obesity   8. Type 2 diabetes with peripheral neuropathy     Plan:   1. Questions were answered.  He is here with his cousin.  2. Continue velcro compression daily.  Has two pairs which are working well for him.  Nursing will give them info from [employer] to get extra foot wraps.  3. Continue lac hydrin for hyperkeratosis.   4. Continue exercise as able.  5. Continue good diabetic foot care.   6. Patient will follow up in 1 year, or when needed. They will call for any concerns or questions.      On day of encounter time spent in chart review and with patient in consultation, exam, education and coordination of care:  22 minutes          ---------------------------------------------------------------------------------------------------------------------     Chief Complaint: Bilateral leg swelling and history of toe ulcerations     History of Present Illness:   Karsten Mccain returns to the  [facility] for follow up of bilateral leg swelling with history of toe ulcerations, severe secondary lymphedema stage III, elephantiasis, fibrosis and scarring with keratoderma and papillomatosis complicated by right leg paresis, old left CVA, type 2 diabetes with peripheral neuropathy and morbid obesity.  At his last visit his swelling was slightly up.  He was to continue velcro compression, use Lac hydrin for hyperkeratosis and to  exercise as able.   Today at follow up he reports he is doing well and the swelling is controlled.  The thickening of the skin is better.  He is here with his cousin.  He has not had any new ulcerations or signs of infections.  There has been no new numbness, tingling, weakness, masses, rashes, shortness of breath or chest pain. There has been no new fevers or pain.  Pain is rated a 0 on a 10 point scale.  He would like to get velcro foot pieces. He is getting new diabetic shoes as ordered by Dr. Hodgson.    Past Medical History:    Past Medical History:   Diagnosis Date     Abscess or cellulitis of leg 2/27/2015     Acquired lymphedema 1/11/2011     Activated protein C resistance (H) 1/16/2011     Advance directive discussed with patient 12/20/2010    Overview:  Patient has identified Health Care Agent(s): Yes Add Health Care Agents: Yes   Health Care Agent(s): Primary Health Care Agent: Miguel Mccain Relationship: Father Phone: [phone]  Secondary Health Care Agent: Ana Lilia Mccain Relationship: Mother Phone: [phone]     Patient has Advanced Care Plan Documents (Health Care Directive, POLST): Yes   Advance Care Plan Documents: Health     Airway hyperreactivity 2/27/2013     Amnesia 9/2/2010     Ankylosing polyarthritis (H) 6/1/2006    Overview:  On methotrexate and remicade  Overview:  diagnosed 1982 sees Dr. Reis      Apnea, sleep 6/13/2006    Overview:  Uses nasal BIPAP inactive icd-9 diagnosis auto replaced with icd-10, display name retained//mporz      Arthralgia of hip 3/5/2013     Avitaminosis D 3/8/2012    Overview:  inactive icd-9 diagnosis auto replaced with icd-10, display name retained//mporz      Benign prostatic hyperplasia with urinary obstruction 2/4/2008     Cannot sleep 11/6/2008    Overview:  inactive icd-9 diagnosis auto replaced with icd-10, display name retained//mporz      Cardiac failure right (H) 3/30/2011     Cerebral infarct (H) 1/11/2011    Overview:  Basal ganglion        "Clotting disorder (H)      Combined fat and carbohydrate induced hyperlipemia 11/25/2010     Diabetes (H) 1/11/2011    Overview:  HEMOGLOBIN A1C (%)  Date Value  11/27/2010 9.1*  7/1/2004 6.2        Diabetic neuropathy (H) 3/1/2011     Disturbance of skin sensation 11/2/2015     DU (duodenal ulcer) 3/29/2015     Encounter for therapeutic drug monitoring 11/5/2013     Essential (primary) hypertension 11/2/2015    Overview:  inactive icd-9 diagnosis auto replaced with icd-10, display name retained//calli      Extreme obesity 11/2/2015     Foot ulcer (H) 3/5/2013     Hypertensive pulmonary vascular disease (H) 1/11/2012    fused rib cage causing restriction and pulm htn related RV failure.     Stroke (H)     right leg and arm weakness around 2010/2011 he thinks., has poor memory and his initial aphasia as well that has improved.        Surgical History:   Past Surgical History:   Procedure Laterality Date     CHOLECYSTECTOMY       OTHER SURGICAL HISTORY      colostomy        Medications:    Current Outpatient Medications   Medication     acetaminophen (TYLENOL) 500 MG tablet     acetic acid (VOSOL) 2 % otic solution     ammonium lactate (LAC-HYDRIN) 12 % lotion     amoxicillin (AMOXIL) 500 MG capsule     atorvastatin (LIPITOR) 40 MG tablet     baclofen (LIORESAL) 10 MG tablet     BD INSULIN SYRINGE U-500 1/2 mL 31 gauge x 15/64\" Syrg     BD ULTRA-FINE PAPO PEN NEEDLE 32 gauge x 5/32\" Ndle     blood glucose test (CONTOUR NEXT TEST STRIPS) strips     bumetanide (BUMEX) 1 MG tablet     canagliflozin-metformin 150-1,000 mg Tab     cholecalciferol, vitamin D3, 5,000 unit capsule     clindamycin (CLINDAGEL) 1 % gel     clotrimazole (LOTRIMIN) 1 % cream     CONTOUR NEXT TEST STRIPS strips     cyclobenzaprine (FLEXERIL) 5 MG tablet     diphenhydrAMINE (BENADRYL) 12.5 mg/5 mL liquid     DULoxetine (CYMBALTA) 60 MG capsule     empagliflozin 25 mg Tab     ferrous gluconate 324 mg (37.5 mg iron) Tab     finasteride (PROSCAR) 5 mg " "tablet     folic acid (FOLVITE) 1 MG tablet     gabapentin (NEURONTIN) 400 MG capsule     generic lancets (ACCU-CHEK MULTICLIX LANCET)     glimepiride (AMARYL) 4 MG tablet     guaiFENesin (HUMIBID 3) 400 mg Tab     hydrocortisone 1 % cream     inFLIXimab (REMICADE) 100 mg injection     insulin regular hum U-500 conc, HumuLIN R, (HUMULIN R CONC) 500 unit/mL CONCENTRATED injection     insulin syringe-needle U-100 1 mL 31 gauge x 5/16 Syrg     insulin syringe-needle U-100 1/2 mL 31 x 5/16\" Syrg     insulin U-500 syringe-needle (BD INSULIN SYRINGE U-500) 1/2 mL 31 gauge x 15/64\" Syrg     JARDIANCE 25 mg Tab     lactic acid-urea Lotn     levETIRAcetam (KEPPRA) 750 MG tablet     lidocaine (LIDODERM) 5 %     lisinopril (PRINIVIL,ZESTRIL) 20 MG tablet     loperamide (IMODIUM) 2 mg capsule     loratadine (CLARITIN) 10 mg tablet     magnesium gluconate 30 mg (550 mg) Tab     magnesium oxide 250 mg Tab     methotrexate 2.5 MG tablet     metoprolol succinate (TOPROL-XL) 25 MG     miscellaneous medical supply Misc     multivitamin-iron-folic acid  mg-mcg Tab     NASONEX 50 mcg/actuation nasal spray     nitroglycerin (NITROSTAT) 0.4 MG SL tablet     nystatin (MYCOSTATIN) powder     omeprazole (PRILOSEC) 20 MG capsule     pen needle, diabetic (BD ULTRA-FINE PAPO PEN NEEDLE) 32 gauge x 5/32\" Ndle     polymyxin B-trimethoprim (FOR POLYTRIM) 10,000 unit- 1 mg/mL Drop ophthalmic drops     potassium chloride (KLOR-CON) 10 MEQ CR tablet     prochlorperazine (COMPAZINE) 10 MG tablet     spironolactone (ALDACTONE) 50 MG tablet     tamsulosin (FLOMAX) 0.4 mg Cp24     terbinafine HCl (LAMISIL AT) 1 % cream     tobramycin-dexamethasone (TOBRADEX) ophthalmic solution     urea-alpha hydroxy acids (ATRAC-TAIN) 10-4 % Crea     warfarin (COUMADIN) 3 MG tablet     zinc gluconate 50 mg tablet     canagliflozin (INVOKANA) 300 mg Tab     metFORMIN (GLUCOPHAGE) 500 MG tablet     pantoprazole (PROTONIX) 40 MG tablet     SYNJARDY XR 12.5-1,000 mg " [facility]     No current facility-administered medications for this visit.        Allergies:    Allergies   Allergen Reactions     Semaglutide Diarrhea     Aspirin Other (See Comments)     Not allergic. Patient is on coumadin, Patient on coumadin     Clindamycin Other (See Comments)     Significant heartburn when taking 300 mg four times per day .     Levaquin [Levofloxacin] Other (See Comments)     Muscle burn per pt.     Lovastatin Muscle Pain (Myalgia)     LW Reaction: myalgias     Other Allergy (See Comments) [External Allergen Needs Reconciliation - See Comment] Other (See Comments)     PN: LW FI1: nka, PN: LW CM1: Contrast Adverse Reaction - None Reaction :, PN: LW CM1: Contrast Adverse Reaction - None Reaction :        Family history:   Family History   Problem Relation Age of Onset     Arthritis Mother      Heart Disease Father      Diabetes Sister      Cancer Sister      Uterine Cancer Sister      Diabetes Sister      No Known Problems Sister      Cerebrovascular Disease Brother         Social History:   Social History     Tobacco Use     Smoking status: Never Smoker     Smokeless tobacco: Never Used   Substance Use Topics     Alcohol use: No     Comment: Alcoholic Drinks/day: rare socially, not in years.     Drug use: No          Review of Systems:     ROS negative except as noted in HPI.     Labs:      I personally reviewed the following lab results today and those on care everywhere, if indicated     No results found for: CRP   No results found for: SED   Last Renal Panel:  No results found for: NA, POTASSIUM, CHLORIDE, CO2, ANIONGAP, GLC, BUN, CR, GFRESTIMATED, HERLINDA, PHOS, ALBUMIN   No results found for: WBC  No results found for: RBC  No results found for: HGB  No results found for: HCT  No components found for: MCT  No results found for: MCV  No results found for: MCH  No results found for: MCHC  No results found for: RDW  No results found for: PLT   No results found for: A1C   No results found for: TSH    No results found for: VITDT     @LABNT[cult:*@     Ref Range & Units 12 d ago    HEMOGLOBIN                13.5 - 17.5 g/dL 16.1        MCV                       80 - 100 fL 98        Resulting Agency  [facility]   Specimen Collected: 06/30/21  2:01 PM     6/30/21  Cr 0.86  GFR > 60  HgbA1c 7.6  Chol 126  Trigly 353  HDL 32  LDL 23     Imaging:     I personally reviewed the following imaging results today and those on care everywhere, if indicated     No new results found for this visit on 07/12/21.            Physical Exam:     Vital Signs: /60   Pulse 76   Temp 98.9  F (37.2  C) (Oral)   Resp 20  There is no height or weight on file to calculate BMI.   Wt Readings from Last 2 Encounters:   02/22/21 (!) 365 lb 3.2 oz (165.7 kg)   12/05/19 (!) 357 lb (161.9 kg)   .    Circumferential Volume Measures:     No flowsheet data found.  Unable to load.  Reviewed and measures stable.    General: In no apparent distress.      Psych: Alert and oriented X 3. Affect normal.     HEENT: Atraumatic, normocephalic     Vascular: There are no significant telangietasias, medial ankle venous flares, venous varicosities  and spider veins .      Neurological:  Decreased to PP and LT in the feet bilaterally.  Strength normal in hip flexion, knee flexion/extension, ankle dorsiflexion and great toe extension bilaterally.       Integumentary: Skin of the legs is uniformly warm and dry, erythematous, with papillomatosis, with hyperkeratosis and keratoderma with positive Stemmer's Sign in the bilateral toes.  The hyperkeratosis has improved.  His skin is softer especially in the feet.   There are no open ulcerations.  Toenails are hypertrophied. There is no pain to palpation.       Laura Levy MD, Founding Diplomate ABWMS, FACCWS, FAAPMR   Medical Director Wound Care and Lymphedema   [facility]   [phone]         This note was dictated using a voice  recognition software.  Any grammatical or context distortion are unintentional and inherent to the software.

## 2021-07-12 NOTE — PATIENT INSTRUCTIONS
"Continue velcro compression daily.  Has two pairs which are working well for him.  Continue lac hydrin for hyperkeratosis.   Continue exercise as able.  Continue good diabetic foot care.   Follow up in 1 year, or when needed. They will call for any concerns or questions.    To order more velcro compression/foot wraps, please call [facility]:   [employer] Customer service at [phone].      Swelling in the legs can be caused by many reasons. No matter what the reason, treatment usually includes some type of compression. You should wear your compression socks as much as you can. Your compression should be put on first thing in the morning. Take the compression off at night. It is especially important to wear them with long periods of sitting/standing, long car rides or if you will be flying. Going without compression for even brief periods of time can be damaging to your legs and your health.  Compression socks should get replaced usually every 4-6 months. They do not need to be worn at night while in bed. If we have seen you in the last year, we can refill your sock prescription, otherwise your primary care provider is able to refill them for you. Call us with any problems or questions.   Compression Velcro may need to be replaced every 9-12 months.  Often the liners and socks need to be replaced every three or four months.  The neoprene velcro may last up to 2 years.  If the velcro becomes worn a tailor may be able to repair it for you inexpensively.    If you do a lot of standing, it is good to do calf raises to help keep the blood pumping. If you sit a lot at work, it is good to get up periodically to walk around. Elevation of the foot of your bed 4-6\" helps the blood return back to where it is needed.   Please call us if you have any questions [phone]    Thank you for choosing [facility].        "

## 2021-07-12 NOTE — PROGRESS NOTES
No new concerns. Velcro compression working well. Hasn't taken his water pills yet today. Needs new velcro compression.